# Patient Record
Sex: MALE | Race: WHITE | NOT HISPANIC OR LATINO | URBAN - METROPOLITAN AREA
[De-identification: names, ages, dates, MRNs, and addresses within clinical notes are randomized per-mention and may not be internally consistent; named-entity substitution may affect disease eponyms.]

---

## 2023-04-17 ENCOUNTER — LAB VISIT (OUTPATIENT)
Dept: LAB | Facility: HOSPITAL | Age: 53
End: 2023-04-17
Payer: COMMERCIAL

## 2023-04-17 ENCOUNTER — OFFICE VISIT (OUTPATIENT)
Dept: INTERNAL MEDICINE | Facility: CLINIC | Age: 53
End: 2023-04-17
Payer: COMMERCIAL

## 2023-04-17 ENCOUNTER — LAB VISIT (OUTPATIENT)
Dept: LAB | Facility: HOSPITAL | Age: 53
End: 2023-04-17
Attending: FAMILY MEDICINE
Payer: COMMERCIAL

## 2023-04-17 VITALS
DIASTOLIC BLOOD PRESSURE: 90 MMHG | WEIGHT: 315 LBS | TEMPERATURE: 98 F | HEIGHT: 74 IN | SYSTOLIC BLOOD PRESSURE: 140 MMHG | OXYGEN SATURATION: 96 % | HEART RATE: 76 BPM | BODY MASS INDEX: 40.43 KG/M2

## 2023-04-17 DIAGNOSIS — E11.9 TYPE 2 DIABETES MELLITUS WITHOUT COMPLICATION, UNSPECIFIED WHETHER LONG TERM INSULIN USE: ICD-10-CM

## 2023-04-17 DIAGNOSIS — Z12.5 SCREENING FOR PROSTATE CANCER: ICD-10-CM

## 2023-04-17 DIAGNOSIS — I10 HYPERTENSION, UNSPECIFIED TYPE: ICD-10-CM

## 2023-04-17 DIAGNOSIS — Z11.4 ENCOUNTER FOR SCREENING FOR HUMAN IMMUNODEFICIENCY VIRUS (HIV): ICD-10-CM

## 2023-04-17 DIAGNOSIS — G47.33 OSA ON CPAP: ICD-10-CM

## 2023-04-17 DIAGNOSIS — E66.01 MORBID OBESITY: ICD-10-CM

## 2023-04-17 DIAGNOSIS — I10 HYPERTENSION, UNSPECIFIED TYPE: Primary | ICD-10-CM

## 2023-04-17 DIAGNOSIS — Z11.59 NEED FOR HEPATITIS C SCREENING TEST: ICD-10-CM

## 2023-04-17 DIAGNOSIS — Z86.010 PERSONAL HISTORY OF COLONIC POLYPS: ICD-10-CM

## 2023-04-17 DIAGNOSIS — R07.9 CHEST PAIN, UNSPECIFIED TYPE: ICD-10-CM

## 2023-04-17 DIAGNOSIS — F41.9 ANXIETY: ICD-10-CM

## 2023-04-17 DIAGNOSIS — Z12.11 SCREEN FOR COLON CANCER: ICD-10-CM

## 2023-04-17 PROBLEM — Z86.0100 PERSONAL HISTORY OF COLONIC POLYPS: Status: ACTIVE | Noted: 2023-04-17

## 2023-04-17 PROCEDURE — 99999 PR PBB SHADOW E&M-NEW PATIENT-LVL V: ICD-10-PCS | Mod: PBBFAC,,, | Performed by: FAMILY MEDICINE

## 2023-04-17 PROCEDURE — 82607 VITAMIN B-12: CPT | Performed by: FAMILY MEDICINE

## 2023-04-17 PROCEDURE — 82570 ASSAY OF URINE CREATININE: CPT | Performed by: FAMILY MEDICINE

## 2023-04-17 PROCEDURE — 83036 HEMOGLOBIN GLYCOSYLATED A1C: CPT | Performed by: FAMILY MEDICINE

## 2023-04-17 PROCEDURE — 84153 ASSAY OF PSA TOTAL: CPT | Performed by: FAMILY MEDICINE

## 2023-04-17 PROCEDURE — 80061 LIPID PANEL: CPT | Performed by: FAMILY MEDICINE

## 2023-04-17 PROCEDURE — 3061F NEG MICROALBUMINURIA REV: CPT | Mod: CPTII,S$GLB,, | Performed by: FAMILY MEDICINE

## 2023-04-17 PROCEDURE — 1159F MED LIST DOCD IN RCRD: CPT | Mod: CPTII,S$GLB,, | Performed by: FAMILY MEDICINE

## 2023-04-17 PROCEDURE — 3077F SYST BP >= 140 MM HG: CPT | Mod: CPTII,S$GLB,, | Performed by: FAMILY MEDICINE

## 2023-04-17 PROCEDURE — 3066F NEPHROPATHY DOC TX: CPT | Mod: CPTII,S$GLB,, | Performed by: FAMILY MEDICINE

## 2023-04-17 PROCEDURE — 3080F DIAST BP >= 90 MM HG: CPT | Mod: CPTII,S$GLB,, | Performed by: FAMILY MEDICINE

## 2023-04-17 PROCEDURE — 3080F PR MOST RECENT DIASTOLIC BLOOD PRESSURE >= 90 MM HG: ICD-10-PCS | Mod: CPTII,S$GLB,, | Performed by: FAMILY MEDICINE

## 2023-04-17 PROCEDURE — 99204 PR OFFICE/OUTPT VISIT, NEW, LEVL IV, 45-59 MIN: ICD-10-PCS | Mod: S$GLB,,, | Performed by: FAMILY MEDICINE

## 2023-04-17 PROCEDURE — 3051F PR MOST RECENT HEMOGLOBIN A1C LEVEL 7.0 - < 8.0%: ICD-10-PCS | Mod: CPTII,S$GLB,, | Performed by: FAMILY MEDICINE

## 2023-04-17 PROCEDURE — 87389 HIV-1 AG W/HIV-1&-2 AB AG IA: CPT | Performed by: FAMILY MEDICINE

## 2023-04-17 PROCEDURE — 86803 HEPATITIS C AB TEST: CPT | Performed by: FAMILY MEDICINE

## 2023-04-17 PROCEDURE — 80053 COMPREHEN METABOLIC PANEL: CPT | Performed by: FAMILY MEDICINE

## 2023-04-17 PROCEDURE — 1159F PR MEDICATION LIST DOCUMENTED IN MEDICAL RECORD: ICD-10-PCS | Mod: CPTII,S$GLB,, | Performed by: FAMILY MEDICINE

## 2023-04-17 PROCEDURE — 3008F BODY MASS INDEX DOCD: CPT | Mod: CPTII,S$GLB,, | Performed by: FAMILY MEDICINE

## 2023-04-17 PROCEDURE — 99999 PR PBB SHADOW E&M-NEW PATIENT-LVL V: CPT | Mod: PBBFAC,,, | Performed by: FAMILY MEDICINE

## 2023-04-17 PROCEDURE — 36415 COLL VENOUS BLD VENIPUNCTURE: CPT | Performed by: FAMILY MEDICINE

## 2023-04-17 PROCEDURE — 3061F PR NEG MICROALBUMINURIA RESULT DOCUMENTED/REVIEW: ICD-10-PCS | Mod: CPTII,S$GLB,, | Performed by: FAMILY MEDICINE

## 2023-04-17 PROCEDURE — 3051F HG A1C>EQUAL 7.0%<8.0%: CPT | Mod: CPTII,S$GLB,, | Performed by: FAMILY MEDICINE

## 2023-04-17 PROCEDURE — 3008F PR BODY MASS INDEX (BMI) DOCUMENTED: ICD-10-PCS | Mod: CPTII,S$GLB,, | Performed by: FAMILY MEDICINE

## 2023-04-17 PROCEDURE — 99204 OFFICE O/P NEW MOD 45 MIN: CPT | Mod: S$GLB,,, | Performed by: FAMILY MEDICINE

## 2023-04-17 PROCEDURE — 3066F PR DOCUMENTATION OF TREATMENT FOR NEPHROPATHY: ICD-10-PCS | Mod: CPTII,S$GLB,, | Performed by: FAMILY MEDICINE

## 2023-04-17 PROCEDURE — 3077F PR MOST RECENT SYSTOLIC BLOOD PRESSURE >= 140 MM HG: ICD-10-PCS | Mod: CPTII,S$GLB,, | Performed by: FAMILY MEDICINE

## 2023-04-17 RX ORDER — CLONAZEPAM 1 MG/1
1 TABLET ORAL 2 TIMES DAILY PRN
COMMUNITY
End: 2023-06-09 | Stop reason: SDUPTHER

## 2023-04-17 RX ORDER — METOPROLOL SUCCINATE 100 MG/1
100 TABLET, EXTENDED RELEASE ORAL DAILY
COMMUNITY
Start: 1995-04-01 | End: 2023-10-12 | Stop reason: SDUPTHER

## 2023-04-17 RX ORDER — METFORMIN HYDROCHLORIDE 500 MG/1
500 TABLET ORAL 2 TIMES DAILY WITH MEALS
COMMUNITY
End: 2023-05-24 | Stop reason: SDUPTHER

## 2023-04-17 RX ORDER — OMEPRAZOLE 20 MG/1
20 CAPSULE, DELAYED RELEASE ORAL DAILY
COMMUNITY
End: 2023-10-12 | Stop reason: SDUPTHER

## 2023-04-17 RX ORDER — CITALOPRAM 40 MG/1
40 TABLET, FILM COATED ORAL DAILY
COMMUNITY
End: 2023-10-12 | Stop reason: SDUPTHER

## 2023-04-17 RX ORDER — ASPIRIN 325 MG
325 TABLET ORAL DAILY
COMMUNITY

## 2023-04-17 NOTE — PROGRESS NOTES
Subjective:      Patient ID: Yg Oh is a 53 y.o. male.    Chief Complaint: Establish Care    HPI estb care for issues problist below    Also occas nonexert chest pain w/o sob but occas orantes other times not every exertion    Anxiety since 20s: infreq prn klonopinand toprol helps    Walks 3=-4 miles /day at work    Patient Active Problem List   Diagnosis    Hypertension    Diabetes mellitus, type 2    Morbid obesity    Anxiety    Personal history of colonic polyps    MEGAN on CPAP      Past Medical History:   Diagnosis Date    Anxiety     Depression     Diabetes mellitus, type 2     GERD (gastroesophageal reflux disease)     Hypertension     Morbid obesity     MEGAN on CPAP     Personal history of colonic polyps       Past Surgical History:   Procedure Laterality Date    tka lef      2010      Social History     Socioeconomic History    Marital status:    Tobacco Use    Smoking status: Never    Smokeless tobacco: Current   Social History Narrative     . Moved to  from [place]      No family history on file.           Objective:     Physical Exam  Vitals and nursing note reviewed.   Constitutional:       Appearance: He is well-developed.   HENT:      Head: Normocephalic and atraumatic.      Right Ear: External ear normal.      Left Ear: External ear normal.      Nose: Nose normal.   Eyes:      General: No scleral icterus.     Conjunctiva/sclera: Conjunctivae normal.      Pupils: Pupils are equal, round, and reactive to light.   Neck:      Vascular: No carotid bruit.   Cardiovascular:      Rate and Rhythm: Normal rate and regular rhythm.      Heart sounds: Normal heart sounds. No murmur heard.    No friction rub. No gallop.   Pulmonary:      Effort: Pulmonary effort is normal.      Breath sounds: Normal breath sounds. No wheezing.   Abdominal:      General: Bowel sounds are normal. There is no distension.      Palpations: Abdomen is soft. There is no mass.      Tenderness: There is no  abdominal tenderness. There is no guarding or rebound.   Musculoskeletal:         General: No tenderness. Normal range of motion.      Cervical back: Normal range of motion and neck supple.   Lymphadenopathy:      Cervical: No cervical adenopathy.   Skin:     General: Skin is warm and dry.      Findings: No erythema or rash.   Neurological:      Mental Status: He is alert and oriented to person, place, and time.      Cranial Nerves: No cranial nerve deficit.      Coordination: Coordination normal.   Psychiatric:         Behavior: Behavior normal.         Thought Content: Thought content normal.         Judgment: Judgment normal.     Assessment:         ICD-10-CM ICD-9-CM   1. Hypertension, unspecified type  I10 401.9   2. Type 2 diabetes mellitus without complication, unspecified whether long term insulin use  E11.9 250.00   3. Encounter for screening for human immunodeficiency virus (HIV)  Z11.4 V73.89   4. Need for hepatitis C screening test  Z11.59 V73.89   5. Screening for prostate cancer  Z12.5 V76.44   6. Screen for colon cancer  Z12.11 V76.51   7. Morbid obesity  E66.01 278.01   8. Anxiety  F41.9 300.00   9. Chest pain, unspecified type  R07.9 786.50   10. Personal history of colonic polyps  Z86.010 V12.72   11. MEGAN on CPAP  G47.33 327.23    Z99.89 V46.8      Plan:    Pcp record  F/u flores two weeks; fu/ on lab ,bp.also consider [id number] d/wd statin    1. Hypertension, unspecified type  -     Comprehensive Metabolic Panel; Future; Expected date: 04/17/2023  -     Hypertension Digital Medicine ([facility]) Enrollment Order  -     Hypertension Digital Medicine ([facility]): Assign Onboarding Questionnaires    2. Type 2 diabetes mellitus without complication, unspecified whether long term insulin use  -     Hemoglobin A1C; Future; Expected date: 04/17/2023  -     Lipid Panel; Future; Expected date: 04/17/2023  -     Microalbumin/Creatinine Ratio, Urine; Future; Expected date: 04/17/2023  -     Vitamin B12; Future; Expected  date: 04/17/2023  -     Diabetes Digital Medicine ([facility]) Enrollment Order  -     Diabetes Digital Medicine ([facility]): Assign Onboarding Questionnaires    3. Encounter for screening for human immunodeficiency virus (HIV)  -     Hepatitis C Antibody; Future; Expected date: 04/17/2023    4. Need for hepatitis C screening test  -     HIV 1/2 Ag/Ab (4th Gen); Future; Expected date: 04/17/2023    5. Screening for prostate cancer  -     PSA, Screening; Future; Expected date: 04/17/2023    6. Screen for colon cancer  -     Ambulatory referral/consult to Endo Procedure ; Future; Expected date: 04/18/2023    7. Morbid obesity  -     Ambulatory referral/consult to Bariatric Surgery; Future; Expected date: 04/24/2023    8. Anxiety    9. Chest pain, unspecified type  -     EKG 12-lead; Future; Expected date: 04/17/2023  -     Stress Echo Which stress agent will be used? Treadmill Exercise; Color Flow Doppler? No; Future    10. Personal history of colonic polyps    11. MEGAN on CPAP     Nonfasting lab    Rf meds when due    D/c chew tob    D/wd bar surg

## 2023-04-18 LAB
ALBUMIN SERPL BCP-MCNC: 4.2 G/DL (ref 3.5–5.2)
ALBUMIN/CREAT UR: 29.4 UG/MG (ref 0–30)
ALP SERPL-CCNC: 125 U/L (ref 55–135)
ALT SERPL W/O P-5'-P-CCNC: 34 U/L (ref 10–44)
ANION GAP SERPL CALC-SCNC: 13 MMOL/L (ref 8–16)
AST SERPL-CCNC: 34 U/L (ref 10–40)
BILIRUB SERPL-MCNC: 0.3 MG/DL (ref 0.1–1)
BUN SERPL-MCNC: 13 MG/DL (ref 6–20)
CALCIUM SERPL-MCNC: 9.3 MG/DL (ref 8.7–10.5)
CHLORIDE SERPL-SCNC: 102 MMOL/L (ref 95–110)
CHOLEST SERPL-MCNC: 218 MG/DL (ref 120–199)
CHOLEST/HDLC SERPL: 6.1 {RATIO} (ref 2–5)
CO2 SERPL-SCNC: 21 MMOL/L (ref 23–29)
COMPLEXED PSA SERPL-MCNC: 0.68 NG/ML (ref 0–4)
CREAT SERPL-MCNC: 1 MG/DL (ref 0.5–1.4)
CREAT UR-MCNC: 102 MG/DL (ref 23–375)
EST. GFR  (NO RACE VARIABLE): >60 ML/MIN/1.73 M^2
ESTIMATED AVG GLUCOSE: 169 MG/DL (ref 68–131)
GLUCOSE SERPL-MCNC: 132 MG/DL (ref 70–110)
HBA1C MFR BLD: 7.5 % (ref 4–5.6)
HCV AB SERPL QL IA: NORMAL
HDLC SERPL-MCNC: 36 MG/DL (ref 40–75)
HDLC SERPL: 16.5 % (ref 20–50)
HIV 1+2 AB+HIV1 P24 AG SERPL QL IA: NORMAL
LDLC SERPL CALC-MCNC: 126.6 MG/DL (ref 63–159)
MICROALBUMIN UR DL<=1MG/L-MCNC: 30 UG/ML
NONHDLC SERPL-MCNC: 182 MG/DL
POTASSIUM SERPL-SCNC: 4.3 MMOL/L (ref 3.5–5.1)
PROT SERPL-MCNC: 7.8 G/DL (ref 6–8.4)
SODIUM SERPL-SCNC: 136 MMOL/L (ref 136–145)
TRIGL SERPL-MCNC: 277 MG/DL (ref 30–150)
VIT B12 SERPL-MCNC: 304 PG/ML (ref 210–950)

## 2023-04-20 ENCOUNTER — TELEPHONE (OUTPATIENT)
Dept: INTERNAL MEDICINE | Facility: CLINIC | Age: 53
End: 2023-04-20
Payer: COMMERCIAL

## 2023-04-20 NOTE — TELEPHONE ENCOUNTER
I tried calling the patient no answer a message was left for the patient to return the call to Dr Lainez office.

## 2023-04-21 RX ORDER — PRAVASTATIN SODIUM 40 MG/1
40 TABLET ORAL DAILY
Qty: 90 TABLET | Refills: 3 | Status: SHIPPED | OUTPATIENT
Start: 2023-04-21 | End: 2024-02-29

## 2023-04-21 NOTE — TELEPHONE ENCOUNTER
I spoke with the patient informing him that Dr Lainez     Pravastatin erxd   Please notify him and main side effect to monitor for is new muscle aches . If this occurs and last more than a few days then stop and let me know.        The patient stated understanding. Thanks for the call.

## 2023-04-21 NOTE — TELEPHONE ENCOUNTER
I spoke with the patient informing him of Dr Lainez recommendation      Please let him know diabetes not quite controlled. Georgette will discuss more at visit   Also should be on statin to help reduce heart attack and stroke risk.   If he has been on beforeand okwith resuming please let me know   If has not then we canstart now or when sees Georgette. Starting statin strongly recommended        The patient stated that he is not on a statin medication.    The patient stated that yes he would like to get on a statin medication.     The patient was informed that a message will be sent to Dr Lainez to order the medication     Patient pharmacy is updated.

## 2023-04-24 ENCOUNTER — HOSPITAL ENCOUNTER (OUTPATIENT)
Dept: CARDIOLOGY | Facility: HOSPITAL | Age: 53
Discharge: HOME | End: 2023-04-24
Payer: COMMERCIAL

## 2023-04-24 DIAGNOSIS — R07.9 CHEST PAIN, UNSPECIFIED TYPE: ICD-10-CM

## 2023-04-24 PROCEDURE — 93005 ELECTROCARDIOGRAM TRACING: CPT

## 2023-04-24 PROCEDURE — 93010 ELECTROCARDIOGRAM REPORT: CPT | Mod: ,,, | Performed by: INTERNAL MEDICINE

## 2023-04-24 PROCEDURE — 93010 EKG 12-LEAD: ICD-10-PCS | Mod: ,,, | Performed by: INTERNAL MEDICINE

## 2023-05-09 ENCOUNTER — TELEPHONE (OUTPATIENT)
Dept: CARDIOLOGY | Facility: HOSPITAL | Age: 53
End: 2023-05-09
Payer: COMMERCIAL

## 2023-05-23 ENCOUNTER — TELEPHONE (OUTPATIENT)
Dept: INTERNAL MEDICINE | Facility: CLINIC | Age: 53
End: 2023-05-23
Payer: COMMERCIAL

## 2023-05-23 ENCOUNTER — PATIENT MESSAGE (OUTPATIENT)
Dept: INTERNAL MEDICINE | Facility: CLINIC | Age: 53
End: 2023-05-23
Payer: COMMERCIAL

## 2023-05-23 RX ORDER — METFORMIN HYDROCHLORIDE 500 MG/1
500 TABLET ORAL 2 TIMES DAILY WITH MEALS
Status: CANCELLED | OUTPATIENT
Start: 2023-05-23

## 2023-05-23 NOTE — TELEPHONE ENCOUNTER
----- Message from Lupe Helm sent at 5/23/2023 10:54 AM CDT -----  Contact: Gurwinder  Type:  RX Refill Request    Who Called: Gurwinder  Refill or New Rx:refill  RX Name and Strength:metFORMIN (GLUCOPHAGE) 500 MG tablet  How is the patient currently taking it? (ex. 1XDay):as prescribed  Is this a 30 day or 90 day RX:30 days  Preferred Pharmacy with phone number:  [facility]/pharmacy #[id number] - [place] - [address] AT [facility]  [address] [place] [address]  Phone: [phone] Fax: [phone]  Local or Mail Order:local  Ordering Provider:Migel  Would the patient rather a call back or a response via [facility]? Call back  Best Call Back Number:[phone]  Additional Information: Pt states he cannot order refills of his historical medications on the [facility].     Thanks  TS

## 2023-05-24 NOTE — TELEPHONE ENCOUNTER
No care due was identified.  [facility] Embedded Care Due Messages. Reference number: [id number].   5/24/2023 12:28:42 PM CDT

## 2023-05-25 RX ORDER — METFORMIN HYDROCHLORIDE 500 MG/1
500 TABLET ORAL 2 TIMES DAILY WITH MEALS
Qty: 60 TABLET | Refills: 6 | Status: SHIPPED | OUTPATIENT
Start: 2023-05-25 | End: 2023-10-12

## 2023-06-08 ENCOUNTER — PATIENT MESSAGE (OUTPATIENT)
Dept: ADMINISTRATIVE | Facility: OTHER | Age: 53
End: 2023-06-08
Payer: COMMERCIAL

## 2023-06-08 ENCOUNTER — PATIENT MESSAGE (OUTPATIENT)
Dept: INTERNAL MEDICINE | Facility: CLINIC | Age: 53
End: 2023-06-08
Payer: COMMERCIAL

## 2023-06-09 NOTE — TELEPHONE ENCOUNTER
No care due was identified.  Health [facility] Embedded Care Due Messages. Reference number: [id number].   6/09/2023 9:21:08 AM CDT

## 2023-06-12 RX ORDER — CLONAZEPAM 1 MG/1
1 TABLET ORAL 2 TIMES DAILY PRN
Qty: 60 TABLET | Refills: 1 | Status: SHIPPED | OUTPATIENT
Start: 2023-06-12 | End: 2023-11-02

## 2023-07-07 ENCOUNTER — PATIENT MESSAGE (OUTPATIENT)
Dept: CARDIOLOGY | Facility: HOSPITAL | Age: 53
End: 2023-07-07
Payer: COMMERCIAL

## 2023-09-14 ENCOUNTER — PATIENT MESSAGE (OUTPATIENT)
Dept: ADMINISTRATIVE | Facility: OTHER | Age: 53
End: 2023-09-14
Payer: COMMERCIAL

## 2023-10-12 ENCOUNTER — OFFICE VISIT (OUTPATIENT)
Dept: INTERNAL MEDICINE | Facility: CLINIC | Age: 53
End: 2023-10-12
Payer: COMMERCIAL

## 2023-10-12 VITALS
HEART RATE: 106 BPM | OXYGEN SATURATION: 96 % | TEMPERATURE: 97 F | WEIGHT: 315 LBS | SYSTOLIC BLOOD PRESSURE: 136 MMHG | HEIGHT: 74 IN | BODY MASS INDEX: 40.43 KG/M2 | DIASTOLIC BLOOD PRESSURE: 94 MMHG

## 2023-10-12 DIAGNOSIS — Z86.010 PERSONAL HISTORY OF COLONIC POLYPS: ICD-10-CM

## 2023-10-12 DIAGNOSIS — E11.65 TYPE 2 DIABETES MELLITUS WITH HYPERGLYCEMIA, WITHOUT LONG-TERM CURRENT USE OF INSULIN: ICD-10-CM

## 2023-10-12 DIAGNOSIS — G47.33 OSA ON CPAP: ICD-10-CM

## 2023-10-12 DIAGNOSIS — I10 PRIMARY HYPERTENSION: Primary | ICD-10-CM

## 2023-10-12 DIAGNOSIS — Z82.49 FAMILY HISTORY OF CARDIOVASCULAR DISEASE: ICD-10-CM

## 2023-10-12 DIAGNOSIS — K21.9 GASTROESOPHAGEAL REFLUX DISEASE, UNSPECIFIED WHETHER ESOPHAGITIS PRESENT: ICD-10-CM

## 2023-10-12 DIAGNOSIS — E66.01 MORBID OBESITY: ICD-10-CM

## 2023-10-12 DIAGNOSIS — F41.9 ANXIETY: ICD-10-CM

## 2023-10-12 PROCEDURE — 3008F BODY MASS INDEX DOCD: CPT | Mod: CPTII,S$GLB,, | Performed by: PHYSICIAN ASSISTANT

## 2023-10-12 PROCEDURE — 3051F HG A1C>EQUAL 7.0%<8.0%: CPT | Mod: CPTII,S$GLB,, | Performed by: PHYSICIAN ASSISTANT

## 2023-10-12 PROCEDURE — 3075F PR MOST RECENT SYSTOLIC BLOOD PRESS GE 130-139MM HG: ICD-10-PCS | Mod: CPTII,S$GLB,, | Performed by: PHYSICIAN ASSISTANT

## 2023-10-12 PROCEDURE — 3066F PR DOCUMENTATION OF TREATMENT FOR NEPHROPATHY: ICD-10-PCS | Mod: CPTII,S$GLB,, | Performed by: PHYSICIAN ASSISTANT

## 2023-10-12 PROCEDURE — 4010F ACE/ARB THERAPY RXD/TAKEN: CPT | Mod: CPTII,S$GLB,, | Performed by: PHYSICIAN ASSISTANT

## 2023-10-12 PROCEDURE — 1159F MED LIST DOCD IN RCRD: CPT | Mod: CPTII,S$GLB,, | Performed by: PHYSICIAN ASSISTANT

## 2023-10-12 PROCEDURE — 3061F NEG MICROALBUMINURIA REV: CPT | Mod: CPTII,S$GLB,, | Performed by: PHYSICIAN ASSISTANT

## 2023-10-12 PROCEDURE — 3066F NEPHROPATHY DOC TX: CPT | Mod: CPTII,S$GLB,, | Performed by: PHYSICIAN ASSISTANT

## 2023-10-12 PROCEDURE — 99999 PR PBB SHADOW E&M-EST. PATIENT-LVL V: CPT | Mod: PBBFAC,,, | Performed by: PHYSICIAN ASSISTANT

## 2023-10-12 PROCEDURE — 3061F PR NEG MICROALBUMINURIA RESULT DOCUMENTED/REVIEW: ICD-10-PCS | Mod: CPTII,S$GLB,, | Performed by: PHYSICIAN ASSISTANT

## 2023-10-12 PROCEDURE — 3051F PR MOST RECENT HEMOGLOBIN A1C LEVEL 7.0 - < 8.0%: ICD-10-PCS | Mod: CPTII,S$GLB,, | Performed by: PHYSICIAN ASSISTANT

## 2023-10-12 PROCEDURE — 99214 PR OFFICE/OUTPT VISIT, EST, LEVL IV, 30-39 MIN: ICD-10-PCS | Mod: S$GLB,,, | Performed by: PHYSICIAN ASSISTANT

## 2023-10-12 PROCEDURE — 99214 OFFICE O/P EST MOD 30 MIN: CPT | Mod: S$GLB,,, | Performed by: PHYSICIAN ASSISTANT

## 2023-10-12 PROCEDURE — 1160F RVW MEDS BY RX/DR IN RCRD: CPT | Mod: CPTII,S$GLB,, | Performed by: PHYSICIAN ASSISTANT

## 2023-10-12 PROCEDURE — 99999 PR PBB SHADOW E&M-EST. PATIENT-LVL V: ICD-10-PCS | Mod: PBBFAC,,, | Performed by: PHYSICIAN ASSISTANT

## 2023-10-12 PROCEDURE — 4010F PR ACE/ARB THEARPY RXD/TAKEN: ICD-10-PCS | Mod: CPTII,S$GLB,, | Performed by: PHYSICIAN ASSISTANT

## 2023-10-12 PROCEDURE — 1160F PR REVIEW ALL MEDS BY PRESCRIBER/CLIN PHARMACIST DOCUMENTED: ICD-10-PCS | Mod: CPTII,S$GLB,, | Performed by: PHYSICIAN ASSISTANT

## 2023-10-12 PROCEDURE — 3080F PR MOST RECENT DIASTOLIC BLOOD PRESSURE >= 90 MM HG: ICD-10-PCS | Mod: CPTII,S$GLB,, | Performed by: PHYSICIAN ASSISTANT

## 2023-10-12 PROCEDURE — 1159F PR MEDICATION LIST DOCUMENTED IN MEDICAL RECORD: ICD-10-PCS | Mod: CPTII,S$GLB,, | Performed by: PHYSICIAN ASSISTANT

## 2023-10-12 PROCEDURE — 3080F DIAST BP >= 90 MM HG: CPT | Mod: CPTII,S$GLB,, | Performed by: PHYSICIAN ASSISTANT

## 2023-10-12 PROCEDURE — 3075F SYST BP GE 130 - 139MM HG: CPT | Mod: CPTII,S$GLB,, | Performed by: PHYSICIAN ASSISTANT

## 2023-10-12 PROCEDURE — 3008F PR BODY MASS INDEX (BMI) DOCUMENTED: ICD-10-PCS | Mod: CPTII,S$GLB,, | Performed by: PHYSICIAN ASSISTANT

## 2023-10-12 RX ORDER — LANCETS
EACH MISCELLANEOUS
Qty: 100 EACH | Refills: 6 | Status: SHIPPED | OUTPATIENT
Start: 2023-10-12

## 2023-10-12 RX ORDER — METOPROLOL SUCCINATE 100 MG/1
100 TABLET, EXTENDED RELEASE ORAL DAILY
Qty: 90 TABLET | Refills: 3 | Status: SHIPPED | OUTPATIENT
Start: 2023-10-12 | End: 2024-10-06

## 2023-10-12 RX ORDER — LOSARTAN POTASSIUM 50 MG/1
50 TABLET ORAL DAILY
Qty: 90 TABLET | Refills: 3 | Status: SHIPPED | OUTPATIENT
Start: 2023-10-12 | End: 2024-10-11

## 2023-10-12 RX ORDER — OMEPRAZOLE 20 MG/1
20 CAPSULE, DELAYED RELEASE ORAL DAILY
Qty: 90 CAPSULE | Refills: 3 | Status: SHIPPED | OUTPATIENT
Start: 2023-10-12 | End: 2024-10-06

## 2023-10-12 RX ORDER — INSULIN PUMP SYRINGE, 3 ML
EACH MISCELLANEOUS
Qty: 1 EACH | Refills: 0 | Status: SHIPPED | OUTPATIENT
Start: 2023-10-12 | End: 2024-10-11

## 2023-10-12 RX ORDER — METFORMIN HYDROCHLORIDE 500 MG/1
1000 TABLET ORAL 2 TIMES DAILY WITH MEALS
Qty: 360 TABLET | Refills: 3 | Status: SHIPPED | OUTPATIENT
Start: 2023-10-12 | End: 2024-10-06

## 2023-10-12 RX ORDER — CITALOPRAM 40 MG/1
40 TABLET, FILM COATED ORAL DAILY
Qty: 90 TABLET | Refills: 3 | Status: SHIPPED | OUTPATIENT
Start: 2023-10-12 | End: 2024-10-06

## 2023-10-12 RX ORDER — SEMAGLUTIDE 0.68 MG/ML
INJECTION, SOLUTION SUBCUTANEOUS
Qty: 3 ML | Refills: 0 | Status: SHIPPED | OUTPATIENT
Start: 2023-10-12 | End: 2023-11-09

## 2023-10-12 NOTE — PROGRESS NOTES
Subjective:      Patient ID: Yg Oh is a 53 y.o. male.    Chief Complaint: Hospital Follow Up    HPI  Here today for a follow up for a recent hospitalization after hernia surgery.   Hernia repair surgery last Wednesday (incisional abdominal hernia) and had complication with anesthesia after. Didnt need to be intubated. Lungs filled with fluids during surgery but likely due to positioning during surgery.  Glucose 400s while in the hospital. Hasn't been checking glucose at home because doesn't have glucometer. Only on metformin 500 BID.   Today back to baseline. No chest pain or shortness of breath. No cough or fever. Normal bowel movements. Abd spasms after surgery pretty severe but resolved on Tuesday.     Established care with Dr. Lainez in April. New patient to us.   Colonoscopy: done at outside facility maybe 5 years ago. Pt will track down records.   GLP1:no fam hx of medullary thyroid ca or MEN and no personal history of acute pancreatitis.     Patient Active Problem List   Diagnosis    Hypertension    Type 2 diabetes mellitus without complication, without long-term current use of insulin    Morbid obesity    Anxiety    Personal history of colonic polyps    MEGAN on CPAP         Current Outpatient Medications:     aspirin 325 MG tablet, Take 325 mg by mouth once daily., Disp: , Rfl:     clonazePAM (KLONOPIN) 1 MG tablet, Take 1 tablet (1 mg total) by mouth 2 (two) times daily as needed for Anxiety., Disp: 60 tablet, Rfl: 1    pravastatin (PRAVACHOL) 40 MG tablet, Take 1 tablet (40 mg total) by mouth once daily., Disp: 90 tablet, Rfl: 3    blood sugar diagnostic Strp, Use to check blood sugar twice daily, Disp: 100 each, Rfl: 6    blood-glucose meter kit, Use as instructed to check glucose twice daily, Disp: 1 each, Rfl: 0    citalopram (CELEXA) 40 MG tablet, Take 1 tablet (40 mg total) by mouth once daily., Disp: 90 tablet, Rfl: 3    lancets (LANCETS,THIN) Misc, Use to check blood sugar twice daily,  Disp: 100 each, Rfl: 6    losartan (COZAAR) 50 MG tablet, Take 1 tablet (50 mg total) by mouth once daily., Disp: 90 tablet, Rfl: 3    metFORMIN (GLUCOPHAGE) 500 MG tablet, Take 2 tablets (1,000 mg total) by mouth 2 (two) times daily with meals., Disp: 360 tablet, Rfl: 3    metoprolol succinate (TOPROL-XL) 100 MG 24 hr tablet, Take 1 tablet (100 mg total) by mouth once daily., Disp: 90 tablet, Rfl: 3    omeprazole (PRILOSEC) 20 MG capsule, Take 1 capsule (20 mg total) by mouth once daily., Disp: 90 capsule, Rfl: 3    semaglutide (OZEMPIC) 0.25 mg or 0.5 mg (2 mg/3 mL) pen injector, Inject 0.25 mg into the skin every 7 days for 14 days, THEN 0.5 mg every 7 days for 14 days., Disp: 3 mL, Rfl: 0    Review of Systems   Constitutional:  Negative for activity change, appetite change, chills, diaphoresis, fatigue, fever and unexpected weight change.   HENT: Negative.  Negative for congestion, hearing loss, postnasal drip, rhinorrhea, sore throat, trouble swallowing and voice change.    Eyes: Negative.  Negative for visual disturbance.   Respiratory: Negative.  Negative for cough, choking, chest tightness and shortness of breath.    Cardiovascular:  Negative for chest pain, palpitations and leg swelling.   Gastrointestinal:  Negative for abdominal distention, abdominal pain, blood in stool, constipation, diarrhea, nausea and vomiting.   Endocrine: Negative for cold intolerance, heat intolerance, polydipsia and polyuria.   Genitourinary: Negative.  Negative for difficulty urinating and frequency.   Musculoskeletal:  Negative for arthralgias, back pain, gait problem, joint swelling and myalgias.   Skin:  Negative for color change, pallor, rash and wound.   Neurological:  Negative for dizziness, tremors, weakness, light-headedness, numbness and headaches.   Hematological:  Negative for adenopathy.   Psychiatric/Behavioral:  Negative for behavioral problems, confusion, self-injury, sleep disturbance and suicidal ideas. The  "patient is not nervous/anxious.      Objective:   BP (!) 136/94 (BP Location: Left arm, Patient Position: Sitting, BP Method: Large (Manual))   Pulse 106   Temp 97.1 °F (36.2 °C) (Tympanic)   Ht 6' 2" (1.88 m)   Wt (!) 180.6 kg (398 lb 2.4 oz)   SpO2 96%   BMI 51.12 kg/m²     Physical Exam  Vitals and nursing note reviewed.   Constitutional:       General: He is not in acute distress.     Appearance: Normal appearance. He is well-developed. He is not ill-appearing, toxic-appearing or diaphoretic.   HENT:      Head: Normocephalic and atraumatic.   Cardiovascular:      Rate and Rhythm: Normal rate and regular rhythm.      Heart sounds: Normal heart sounds. No murmur heard.     No friction rub. No gallop.   Pulmonary:      Effort: Pulmonary effort is normal. No respiratory distress.      Breath sounds: Normal breath sounds. No wheezing or rales.   Skin:     General: Skin is warm.      Findings: No rash.   Neurological:      Mental Status: He is alert and oriented to person, place, and time.   Psychiatric:         Mood and Affect: Mood normal.         Behavior: Behavior normal.         Thought Content: Thought content normal.         Judgment: Judgment normal.       Lab Visit on 04/17/2023   Component Date Value Ref Range Status    Microalbumin, Urine 04/17/2023 30.0  ug/mL Final    Creatinine, Urine 04/17/2023 102.0  23.0 - 375.0 mg/dL Final    Microalb/Creat Ratio 04/17/2023 29.4  0.0 - 30.0 ug/mg Final   Lab Visit on 04/17/2023   Component Date Value Ref Range Status    Hemoglobin A1C 04/17/2023 7.5 (H)  4.0 - 5.6 % Final    Comment: ADA Screening Guidelines:  5.7-6.4%  Consistent with prediabetes  >or=6.5%  Consistent with diabetes    High levels of fetal hemoglobin interfere with the HbA1C  assay. Heterozygous hemoglobin variants (HbS, HgC, etc)do  not significantly interfere with this assay.   However, presence of multiple variants may affect accuracy.      Estimated Avg Glucose 04/17/2023 169 (H)  68 - 131 " mg/dL Final    Cholesterol 04/17/2023 218 (H)  120 - 199 mg/dL Final    Comment: The National Cholesterol Education Program (NCEP) has set the  following guidelines (reference ranges) for Cholesterol:  Optimal.....................<200 mg/dL  Borderline High.............200-239 mg/dL  High........................> or = 240 mg/dL      Triglycerides 04/17/2023 277 (H)  30 - 150 mg/dL Final    Comment: The National Cholesterol Education Program (NCEP) has set the  following guidelines (reference values) for triglycerides:  Normal......................<150 mg/dL  Borderline High.............150-199 mg/dL  High........................200-499 mg/dL      HDL 04/17/2023 36 (L)  40 - 75 mg/dL Final    Comment: The National Cholesterol Education Program (NCEP) has set the  following guidelines (reference values) for HDL Cholesterol:  Low...............<40 mg/dL  Optimal...........>60 mg/dL      LDL Cholesterol 04/17/2023 126.6  63.0 - 159.0 mg/dL Final    Comment: The National Cholesterol Education Program (NCEP) has set the  following guidelines (reference values) for LDL Cholesterol:  Optimal.......................<130 mg/dL  Borderline High...............130-159 mg/dL  High..........................160-189 mg/dL  Very High.....................>190 mg/dL      HDL/Cholesterol Ratio 04/17/2023 16.5 (L)  20.0 - 50.0 % Final    Total Cholesterol/HDL Ratio 04/17/2023 6.1 (H)  2.0 - 5.0 Final    Non-HDL Cholesterol 04/17/2023 182  mg/dL Final    Comment: Risk category and Non-HDL cholesterol goals:  Coronary heart disease (CHD)or equivalent (10-year risk of CHD >20%):  Non-HDL cholesterol goal     <130 mg/dL  Two or more CHD risk factors and 10-year risk of CHD <= 20%:  Non-HDL cholesterol goal     <160 mg/dL  0 to 1 CHD risk factor:  Non-HDL cholesterol goal     <190 mg/dL      Sodium 04/17/2023 136  136 - 145 mmol/L Final    Potassium 04/17/2023 4.3  3.5 - 5.1 mmol/L Final    Chloride 04/17/2023 102  95 - 110 mmol/L Final    CO2  04/17/2023 21 (L)  23 - 29 mmol/L Final    Glucose 04/17/2023 132 (H)  70 - 110 mg/dL Final    BUN 04/17/2023 13  6 - 20 mg/dL Final    Creatinine 04/17/2023 1.0  0.5 - 1.4 mg/dL Final    Calcium 04/17/2023 9.3  8.7 - 10.5 mg/dL Final    Total Protein 04/17/2023 7.8  6.0 - 8.4 g/dL Final    Albumin 04/17/2023 4.2  3.5 - 5.2 g/dL Final    Total Bilirubin 04/17/2023 0.3  0.1 - 1.0 mg/dL Final    Comment: For infants and newborns, interpretation of results should be based  on gestational age, weight and in agreement with clinical  observations.    Premature Infant recommended reference ranges:  Up to 24 hours.............<8.0 mg/dL  Up to 48 hours............<12.0 mg/dL  3-5 days..................<15.0 mg/dL  6-29 days.................<15.0 mg/dL      Alkaline Phosphatase 04/17/2023 125  55 - 135 U/L Final    AST 04/17/2023 34  10 - 40 U/L Final    ALT 04/17/2023 34  10 - 44 U/L Final    Anion Gap 04/17/2023 13  8 - 16 mmol/L Final    eGFR 04/17/2023 >60.0  >60 mL/min/1.73 m^2 Final    PSA, Screen 04/17/2023 0.68  0.00 - 4.00 ng/mL Final    Comment: The testing method is a chemiluminescent microparticle immunoassay   manufactured by [employer] and performed on the [employer]   or   ???? system. Values obtained with different assay manufacturers   for   methods may be different and cannot be used interchangeably.  PSA Expected levels:  Hormonal Therapy: <0.05 ng/ml  Prostatectomy: <0.01 ng/ml  Radiation Therapy: <1.00 ng/ml      Hepatitis C Ab 04/17/2023 Non-reactive  Non-reactive Final    HIV 1/2 Ag/Ab 04/17/2023 Non-reactive  Non-reactive Final    Vitamin B-12 04/17/2023 304  210 - 950 pg/mL Final     Results for orders placed or performed during the hospital encounter of 04/24/23   EKG 12-lead    Collection Time: 04/24/23  3:49 PM    Narrative    Test Reason : R07.9,    Vent. Rate : 069 BPM     Atrial Rate : 069 BPM     P-R Int : 172 ms          QRS Dur : 088 ms      QT Int : 402 ms       P-R-T Axes :  057 060 071 degrees     QTc Int : 430 ms    Normal sinus rhythm  Low voltage QRS  No previous ECGs available  Confirmed by Mina Nix MD ([id number]) on 4/24/2023 8:52:31 PM    Referred By: BISI MACDONALD           Confirmed By:Mina Nix MD      The 10-year ASCVD risk score (Verena OCONNELL, et al., 2019) is: 16.8%    Values used to calculate the score:      Age: 53 years      Sex: Male      Is Non- : No      Diabetic: Yes      Tobacco smoker: No      Systolic Blood Pressure: 136 mmHg      Is BP treated: Yes      HDL Cholesterol: 36 mg/dL      Total Cholesterol: 218 mg/dL    40+ minutes of total time spent on the encounter, which includes face to face time and non-face to face time preparing to see the patient (eg, review of tests), Obtaining and/or reviewing separately obtained history, documenting clinical information in the electronic or other health record, independently interpreting results (not separately reported) and communicating results to the patient/family/caregiver, or Care coordination (not separately reported).    Assessment:     1. Primary hypertension    2. Type 2 diabetes mellitus with hyperglycemia, without long-term current use of insulin    3. Morbid obesity    4. Anxiety    5. Personal history of colonic polyps    6. MEGAN on CPAP    7. Family history of cardiovascular disease    8. Gastroesophageal reflux disease, unspecified whether esophagitis present      Plan:   Primary hypertension  -     metoprolol succinate (TOPROL-XL) 100 MG 24 hr tablet; Take 1 tablet (100 mg total) by mouth once daily.  Dispense: 90 tablet; Refill: 3  -     losartan (COZAAR) 50 MG tablet; Take 1 tablet (50 mg total) by mouth once daily.  Dispense: 90 tablet; Refill: 3    Type 2 diabetes mellitus with hyperglycemia, without long-term current use of insulin  -     semaglutide (OZEMPIC) 0.25 mg or 0.5 mg (2 mg/3 mL) pen injector; Inject 0.25 mg into the skin every 7 days for 14 days, THEN 0.5 mg every  7 days for 14 days.  Dispense: 3 mL; Refill: 0  -     POCT Glucose, Hand-Held Device  -     blood-glucose meter kit; Use as instructed to check glucose twice daily  Dispense: 1 each; Refill: 0  -     blood sugar diagnostic Strp; Use to check blood sugar twice daily  Dispense: 100 each; Refill: 6  -     lancets (LANCETS,THIN) Misc; Use to check blood sugar twice daily  Dispense: 100 each; Refill: 6  -     metFORMIN (GLUCOPHAGE) 500 MG tablet; Take 2 tablets (1,000 mg total) by mouth 2 (two) times daily with meals.  Dispense: 360 tablet; Refill: 3  -     Ambulatory referral/consult to Diabetes Education; Future; Expected date: 10/19/2023  -     Hemoglobin A1C; Future; Expected date: 10/12/2023  -     CBC Auto Differential; Future; Expected date: 10/12/2023  -     Comprehensive Metabolic Panel; Future; Expected date: 10/12/2023    Morbid obesity    Anxiety  -     citalopram (CELEXA) 40 MG tablet; Take 1 tablet (40 mg total) by mouth once daily.  Dispense: 90 tablet; Refill: 3    Personal history of colonic polyps    MEGAN on CPAP    Family history of cardiovascular disease  -     Ambulatory referral/consult to Cardiology; Future; Expected date: 10/19/2023    Gastroesophageal reflux disease, unspecified whether esophagitis present  -     omeprazole (PRILOSEC) 20 MG capsule; Take 1 capsule (20 mg total) by mouth once daily.  Dispense: 90 capsule; Refill: 3    -request records for recent hospitalization  -get records from previous colonoscopy  -agree to start ozempic and increase metformin from 500 BID to 1000 BID. SE discussed.       Follow up in about 4 weeks (around 11/9/2023), or if symptoms worsen or fail to improve.

## 2023-10-15 DIAGNOSIS — E11.65 TYPE 2 DIABETES MELLITUS WITH HYPERGLYCEMIA, WITHOUT LONG-TERM CURRENT USE OF INSULIN: ICD-10-CM

## 2023-10-15 NOTE — TELEPHONE ENCOUNTER
No care due was identified.  Health [facility] Embedded Care Due Messages. Reference number: [id number].   10/15/2023 11:37:53 AM CDT

## 2023-10-18 RX ORDER — METFORMIN HYDROCHLORIDE 500 MG/1
500 TABLET ORAL 2 TIMES DAILY WITH MEALS
Qty: 180 TABLET | Refills: 2 | OUTPATIENT
Start: 2023-10-18

## 2023-10-18 NOTE — TELEPHONE ENCOUNTER
Refill Decision Note   Yg Oh  is requesting a refill authorization.  Brief Assessment and Rationale for Refill:  Quick Discontinue     Medication Therapy Plan:  Dose increased to 2 tablets BID WM 10/12/23      Comments:     Note composed:5:34 PM 10/18/2023

## 2023-11-01 NOTE — TELEPHONE ENCOUNTER
No care due was identified.  [facility] Embedded Care Due Messages. Reference number: [id number].   10/31/2023 7:58:20 PM CDT

## 2023-11-02 RX ORDER — CLONAZEPAM 1 MG/1
1 TABLET ORAL 2 TIMES DAILY PRN
Qty: 60 TABLET | Refills: 5 | Status: SHIPPED | OUTPATIENT
Start: 2023-11-02

## 2023-11-03 RX ORDER — CLONAZEPAM 1 MG/1
1 TABLET ORAL 2 TIMES DAILY PRN
Qty: 60 TABLET | Refills: 5 | OUTPATIENT
Start: 2023-11-03

## 2023-11-03 NOTE — TELEPHONE ENCOUNTER
No care due was identified.  Health [facility] Embedded Care Due Messages. Reference number: [id number].   11/03/2023 11:24:48 AM CDT

## 2023-11-13 ENCOUNTER — LAB VISIT (OUTPATIENT)
Dept: LAB | Facility: HOSPITAL | Age: 53
End: 2023-11-13
Attending: PHYSICIAN ASSISTANT
Payer: COMMERCIAL

## 2023-11-13 ENCOUNTER — OFFICE VISIT (OUTPATIENT)
Dept: INTERNAL MEDICINE | Facility: CLINIC | Age: 53
End: 2023-11-13
Payer: COMMERCIAL

## 2023-11-13 VITALS
WEIGHT: 315 LBS | SYSTOLIC BLOOD PRESSURE: 132 MMHG | HEART RATE: 90 BPM | TEMPERATURE: 98 F | DIASTOLIC BLOOD PRESSURE: 76 MMHG | HEIGHT: 74 IN | BODY MASS INDEX: 40.43 KG/M2 | OXYGEN SATURATION: 97 %

## 2023-11-13 DIAGNOSIS — J06.9 VIRAL URI WITH COUGH: ICD-10-CM

## 2023-11-13 DIAGNOSIS — G47.33 OSA (OBSTRUCTIVE SLEEP APNEA): ICD-10-CM

## 2023-11-13 DIAGNOSIS — E11.65 TYPE 2 DIABETES MELLITUS WITH HYPERGLYCEMIA, WITHOUT LONG-TERM CURRENT USE OF INSULIN: ICD-10-CM

## 2023-11-13 DIAGNOSIS — I10 PRIMARY HYPERTENSION: ICD-10-CM

## 2023-11-13 DIAGNOSIS — E11.9 TYPE 2 DIABETES MELLITUS WITHOUT COMPLICATION, WITHOUT LONG-TERM CURRENT USE OF INSULIN: Primary | ICD-10-CM

## 2023-11-13 LAB
ALBUMIN SERPL BCP-MCNC: 3.8 G/DL (ref 3.5–5.2)
ALP SERPL-CCNC: 96 U/L (ref 55–135)
ALT SERPL W/O P-5'-P-CCNC: 27 U/L (ref 10–44)
ANION GAP SERPL CALC-SCNC: 12 MMOL/L (ref 8–16)
AST SERPL-CCNC: 28 U/L (ref 10–40)
BASOPHILS # BLD AUTO: 0.04 K/UL (ref 0–0.2)
BASOPHILS NFR BLD: 0.5 % (ref 0–1.9)
BILIRUB SERPL-MCNC: 0.4 MG/DL (ref 0.1–1)
BUN SERPL-MCNC: 8 MG/DL (ref 6–20)
CALCIUM SERPL-MCNC: 9.9 MG/DL (ref 8.7–10.5)
CHLORIDE SERPL-SCNC: 105 MMOL/L (ref 95–110)
CO2 SERPL-SCNC: 23 MMOL/L (ref 23–29)
CREAT SERPL-MCNC: 1 MG/DL (ref 0.5–1.4)
DIFFERENTIAL METHOD: ABNORMAL
EOSINOPHIL # BLD AUTO: 0.3 K/UL (ref 0–0.5)
EOSINOPHIL NFR BLD: 3.5 % (ref 0–8)
ERYTHROCYTE [DISTWIDTH] IN BLOOD BY AUTOMATED COUNT: 13.7 % (ref 11.5–14.5)
EST. GFR  (NO RACE VARIABLE): >60 ML/MIN/1.73 M^2
ESTIMATED AVG GLUCOSE: 206 MG/DL (ref 68–131)
GLUCOSE SERPL-MCNC: 116 MG/DL (ref 70–110)
HBA1C MFR BLD: 8.8 % (ref 4–5.6)
HCT VFR BLD AUTO: 47.4 % (ref 40–54)
HGB BLD-MCNC: 14.8 G/DL (ref 14–18)
IMM GRANULOCYTES # BLD AUTO: 0.03 K/UL (ref 0–0.04)
IMM GRANULOCYTES NFR BLD AUTO: 0.4 % (ref 0–0.5)
LYMPHOCYTES # BLD AUTO: 2.5 K/UL (ref 1–4.8)
LYMPHOCYTES NFR BLD: 29 % (ref 18–48)
MCH RBC QN AUTO: 27.3 PG (ref 27–31)
MCHC RBC AUTO-ENTMCNC: 31.2 G/DL (ref 32–36)
MCV RBC AUTO: 88 FL (ref 82–98)
MONOCYTES # BLD AUTO: 0.7 K/UL (ref 0.3–1)
MONOCYTES NFR BLD: 7.8 % (ref 4–15)
NEUTROPHILS # BLD AUTO: 5 K/UL (ref 1.8–7.7)
NEUTROPHILS NFR BLD: 58.8 % (ref 38–73)
NRBC BLD-RTO: 0 /100 WBC
PLATELET # BLD AUTO: 241 K/UL (ref 150–450)
PMV BLD AUTO: 12.1 FL (ref 9.2–12.9)
POTASSIUM SERPL-SCNC: 4.3 MMOL/L (ref 3.5–5.1)
PROT SERPL-MCNC: 7.8 G/DL (ref 6–8.4)
RBC # BLD AUTO: 5.42 M/UL (ref 4.6–6.2)
SODIUM SERPL-SCNC: 140 MMOL/L (ref 136–145)
WBC # BLD AUTO: 8.47 K/UL (ref 3.9–12.7)

## 2023-11-13 PROCEDURE — 3066F PR DOCUMENTATION OF TREATMENT FOR NEPHROPATHY: ICD-10-PCS | Mod: CPTII,S$GLB,, | Performed by: PHYSICIAN ASSISTANT

## 2023-11-13 PROCEDURE — 3078F PR MOST RECENT DIASTOLIC BLOOD PRESSURE < 80 MM HG: ICD-10-PCS | Mod: CPTII,S$GLB,, | Performed by: PHYSICIAN ASSISTANT

## 2023-11-13 PROCEDURE — 99213 OFFICE O/P EST LOW 20 MIN: CPT | Mod: S$GLB,,, | Performed by: PHYSICIAN ASSISTANT

## 2023-11-13 PROCEDURE — 99999 PR PBB SHADOW E&M-EST. PATIENT-LVL V: CPT | Mod: PBBFAC,,, | Performed by: PHYSICIAN ASSISTANT

## 2023-11-13 PROCEDURE — 99213 PR OFFICE/OUTPT VISIT, EST, LEVL III, 20-29 MIN: ICD-10-PCS | Mod: S$GLB,,, | Performed by: PHYSICIAN ASSISTANT

## 2023-11-13 PROCEDURE — 3051F PR MOST RECENT HEMOGLOBIN A1C LEVEL 7.0 - < 8.0%: ICD-10-PCS | Mod: CPTII,S$GLB,, | Performed by: PHYSICIAN ASSISTANT

## 2023-11-13 PROCEDURE — 3008F BODY MASS INDEX DOCD: CPT | Mod: CPTII,S$GLB,, | Performed by: PHYSICIAN ASSISTANT

## 2023-11-13 PROCEDURE — 3061F NEG MICROALBUMINURIA REV: CPT | Mod: CPTII,S$GLB,, | Performed by: PHYSICIAN ASSISTANT

## 2023-11-13 PROCEDURE — 83036 HEMOGLOBIN GLYCOSYLATED A1C: CPT | Performed by: PHYSICIAN ASSISTANT

## 2023-11-13 PROCEDURE — 3078F DIAST BP <80 MM HG: CPT | Mod: CPTII,S$GLB,, | Performed by: PHYSICIAN ASSISTANT

## 2023-11-13 PROCEDURE — 1160F PR REVIEW ALL MEDS BY PRESCRIBER/CLIN PHARMACIST DOCUMENTED: ICD-10-PCS | Mod: CPTII,S$GLB,, | Performed by: PHYSICIAN ASSISTANT

## 2023-11-13 PROCEDURE — 99999 PR PBB SHADOW E&M-EST. PATIENT-LVL V: ICD-10-PCS | Mod: PBBFAC,,, | Performed by: PHYSICIAN ASSISTANT

## 2023-11-13 PROCEDURE — 1159F PR MEDICATION LIST DOCUMENTED IN MEDICAL RECORD: ICD-10-PCS | Mod: CPTII,S$GLB,, | Performed by: PHYSICIAN ASSISTANT

## 2023-11-13 PROCEDURE — 3075F PR MOST RECENT SYSTOLIC BLOOD PRESS GE 130-139MM HG: ICD-10-PCS | Mod: CPTII,S$GLB,, | Performed by: PHYSICIAN ASSISTANT

## 2023-11-13 PROCEDURE — 3075F SYST BP GE 130 - 139MM HG: CPT | Mod: CPTII,S$GLB,, | Performed by: PHYSICIAN ASSISTANT

## 2023-11-13 PROCEDURE — 3051F HG A1C>EQUAL 7.0%<8.0%: CPT | Mod: CPTII,S$GLB,, | Performed by: PHYSICIAN ASSISTANT

## 2023-11-13 PROCEDURE — 4010F ACE/ARB THERAPY RXD/TAKEN: CPT | Mod: CPTII,S$GLB,, | Performed by: PHYSICIAN ASSISTANT

## 2023-11-13 PROCEDURE — 36415 COLL VENOUS BLD VENIPUNCTURE: CPT | Performed by: PHYSICIAN ASSISTANT

## 2023-11-13 PROCEDURE — 4010F PR ACE/ARB THEARPY RXD/TAKEN: ICD-10-PCS | Mod: CPTII,S$GLB,, | Performed by: PHYSICIAN ASSISTANT

## 2023-11-13 PROCEDURE — 1159F MED LIST DOCD IN RCRD: CPT | Mod: CPTII,S$GLB,, | Performed by: PHYSICIAN ASSISTANT

## 2023-11-13 PROCEDURE — 85025 COMPLETE CBC W/AUTO DIFF WBC: CPT | Performed by: PHYSICIAN ASSISTANT

## 2023-11-13 PROCEDURE — 3066F NEPHROPATHY DOC TX: CPT | Mod: CPTII,S$GLB,, | Performed by: PHYSICIAN ASSISTANT

## 2023-11-13 PROCEDURE — 80053 COMPREHEN METABOLIC PANEL: CPT | Performed by: PHYSICIAN ASSISTANT

## 2023-11-13 PROCEDURE — 3008F PR BODY MASS INDEX (BMI) DOCUMENTED: ICD-10-PCS | Mod: CPTII,S$GLB,, | Performed by: PHYSICIAN ASSISTANT

## 2023-11-13 PROCEDURE — 1160F RVW MEDS BY RX/DR IN RCRD: CPT | Mod: CPTII,S$GLB,, | Performed by: PHYSICIAN ASSISTANT

## 2023-11-13 PROCEDURE — 3061F PR NEG MICROALBUMINURIA RESULT DOCUMENTED/REVIEW: ICD-10-PCS | Mod: CPTII,S$GLB,, | Performed by: PHYSICIAN ASSISTANT

## 2023-11-13 RX ORDER — AZELASTINE 1 MG/ML
1 SPRAY, METERED NASAL 2 TIMES DAILY
Qty: 30 ML | Refills: 0 | Status: SHIPPED | OUTPATIENT
Start: 2023-11-13 | End: 2024-02-20

## 2023-11-13 RX ORDER — FLUTICASONE PROPIONATE 50 MCG
1 SPRAY, SUSPENSION (ML) NASAL DAILY
Qty: 9.9 ML | Refills: 1 | Status: SHIPPED | OUTPATIENT
Start: 2023-11-13 | End: 2023-12-28

## 2023-11-13 RX ORDER — SEMAGLUTIDE 1.34 MG/ML
1 INJECTION, SOLUTION SUBCUTANEOUS
Qty: 3 ML | Refills: 11 | Status: SHIPPED | OUTPATIENT
Start: 2023-11-13 | End: 2024-01-18 | Stop reason: DRUGHIGH

## 2023-11-13 RX ORDER — LANCETS
EACH MISCELLANEOUS 2 TIMES DAILY
COMMUNITY
Start: 2023-10-12

## 2023-11-13 NOTE — PROGRESS NOTES
Subjective:      Patient ID: Yg Oh is a 53 y.o. male.    Chief Complaint: Follow-up      Here today for a 1 month follow up after starting on losartan and ozempic. Also increased the metformin. Pt doing great with the chagnes. No SE with the new medications. Does report some weight loss and reduced appetite since starting on the ozempic. Completed 2 weeks of 0.25 and two weeks of 0.5.       URI   This is a new problem. Episode onset: 11 days. There has been no fever. Associated symptoms include congestion, coughing, rhinorrhea, sinus pain, sneezing, a sore throat and wheezing. Pertinent negatives include no abdominal pain, chest pain, diarrhea, dysuria, ear pain, headaches, joint pain, joint swelling, nausea, neck pain, plugged ear sensation, rash, swollen glands or vomiting. Treatments tried: mucinex fast max, theraflu, nyquil. The treatment provided no relief.       Patient Active Problem List   Diagnosis    Hypertension    Type 2 diabetes mellitus without complication, without long-term current use of insulin    Morbid obesity    Anxiety    Personal history of colonic polyps    MEGAN on CPAP         Current Outpatient Medications:     aspirin 325 MG tablet, Take 325 mg by mouth once daily., Disp: , Rfl:     blood sugar diagnostic Strp, Use to check blood sugar twice daily, Disp: 100 each, Rfl: 6    blood-glucose meter kit, Use as instructed to check glucose twice daily, Disp: 1 each, Rfl: 0    citalopram (CELEXA) 40 MG tablet, Take 1 tablet (40 mg total) by mouth once daily., Disp: 90 tablet, Rfl: 3    clonazePAM (KLONOPIN) 1 MG tablet, TAKE 1 TABLET BY MOUTH 2 TIMES DAILY AS NEEDED FOR ANXIETY., Disp: 60 tablet, Rfl: 5    lancets (LANCETS,THIN) Misc, Use to check blood sugar twice daily, Disp: 100 each, Rfl: 6    lancets Misc, Apply topically 2 (two) times daily., Disp: , Rfl:     losartan (COZAAR) 50 MG tablet, Take 1 tablet (50 mg total) by mouth once daily., Disp: 90 tablet, Rfl: 3    metFORMIN  (GLUCOPHAGE) 500 MG tablet, Take 2 tablets (1,000 mg total) by mouth 2 (two) times daily with meals., Disp: 360 tablet, Rfl: 3    metoprolol succinate (TOPROL-XL) 100 MG 24 hr tablet, Take 1 tablet (100 mg total) by mouth once daily., Disp: 90 tablet, Rfl: 3    omeprazole (PRILOSEC) 20 MG capsule, Take 1 capsule (20 mg total) by mouth once daily., Disp: 90 capsule, Rfl: 3    pravastatin (PRAVACHOL) 40 MG tablet, Take 1 tablet (40 mg total) by mouth once daily., Disp: 90 tablet, Rfl: 3    azelastine (ASTELIN) 137 mcg (0.1 %) nasal spray, 1 spray (137 mcg total) by Nasal route 2 (two) times daily., Disp: 30 mL, Rfl: 0    fluticasone propionate (FLONASE) 50 mcg/actuation nasal spray, 1 spray (50 mcg total) by Each Nostril route once daily., Disp: 9.9 mL, Rfl: 1    semaglutide (OZEMPIC) 1 mg/dose (4 mg/3 mL), Inject 1 mg into the skin every 7 days., Disp: 3 mL, Rfl: 11    Review of Systems   Constitutional:  Negative for activity change, appetite change, chills, diaphoresis, fatigue, fever and unexpected weight change.   HENT:  Positive for congestion, rhinorrhea, sinus pain, sneezing and sore throat. Negative for ear pain, hearing loss, postnasal drip, trouble swallowing and voice change.    Eyes: Negative.  Negative for visual disturbance.   Respiratory:  Positive for cough and wheezing. Negative for choking, chest tightness and shortness of breath.    Cardiovascular:  Negative for chest pain, palpitations and leg swelling.   Gastrointestinal:  Negative for abdominal distention, abdominal pain, blood in stool, constipation, diarrhea, nausea and vomiting.   Endocrine: Negative for cold intolerance, heat intolerance, polydipsia and polyuria.   Genitourinary: Negative.  Negative for difficulty urinating, dysuria and frequency.   Musculoskeletal:  Negative for arthralgias, back pain, gait problem, joint pain, joint swelling, myalgias and neck pain.   Skin:  Negative for color change, pallor, rash and wound.  "  Neurological:  Negative for dizziness, tremors, weakness, light-headedness, numbness and headaches.   Hematological:  Negative for adenopathy.   Psychiatric/Behavioral:  Negative for behavioral problems, confusion, self-injury, sleep disturbance and suicidal ideas. The patient is not nervous/anxious.      Objective:   /76 (BP Location: Left arm, Patient Position: Sitting, BP Method: Large (Manual))   Pulse 90   Temp 97.6 °F (36.4 °C) (Tympanic)   Ht 6' 2" (1.88 m)   Wt (!) 177.1 kg (390 lb 7 oz)   SpO2 97%   BMI 50.13 kg/m²     Physical Exam  Vitals reviewed.   Constitutional:       General: He is not in acute distress.     Appearance: Normal appearance. He is well-developed. He is not ill-appearing, toxic-appearing or diaphoretic.   HENT:      Head: Normocephalic and atraumatic.      Right Ear: External ear normal.      Left Ear: External ear normal.      Nose: Nose normal.   Eyes:      Conjunctiva/sclera: Conjunctivae normal.      Pupils: Pupils are equal, round, and reactive to light.   Cardiovascular:      Rate and Rhythm: Normal rate and regular rhythm.      Pulses:           Dorsalis pedis pulses are 2+ on the right side and 2+ on the left side.        Posterior tibial pulses are 2+ on the right side and 2+ on the left side.      Heart sounds: Normal heart sounds. No murmur heard.     No friction rub. No gallop.   Pulmonary:      Effort: Pulmonary effort is normal. No respiratory distress.      Breath sounds: Normal breath sounds. No wheezing or rales.   Chest:      Chest wall: No tenderness.   Abdominal:      General: There is no distension.      Palpations: Abdomen is soft.      Tenderness: There is no abdominal tenderness.   Musculoskeletal:         General: Normal range of motion.      Cervical back: Normal range of motion and neck supple.   Feet:      Right foot:      Protective Sensation: 10 sites tested.  10 sites sensed.      Skin integrity: Skin integrity normal.      Toenail Condition: " Right toenails are normal.      Left foot:      Protective Sensation: 10 sites tested.  10 sites sensed.      Skin integrity: Skin integrity normal.      Toenail Condition: Left toenails are normal.   Lymphadenopathy:      Cervical: No cervical adenopathy.   Skin:     General: Skin is warm and dry.      Capillary Refill: Capillary refill takes less than 2 seconds.      Findings: No rash.   Neurological:      Mental Status: He is alert and oriented to person, place, and time.      Motor: No weakness.      Coordination: Coordination normal.      Gait: Gait normal.   Psychiatric:         Mood and Affect: Mood normal.         Behavior: Behavior normal.         Thought Content: Thought content normal.         Judgment: Judgment normal.         Assessment:     1. Type 2 diabetes mellitus without complication, without long-term current use of insulin    2. Primary hypertension    3. Viral URI with cough    4. MEGAN (obstructive sleep apnea)      Plan:   Type 2 diabetes mellitus without complication, without long-term current use of insulin  -     semaglutide (OZEMPIC) 1 mg/dose (4 mg/3 mL); Inject 1 mg into the skin every 7 days.  Dispense: 3 mL; Refill: 11  -     Ambulatory referral/consult to Optometry; Future; Expected date: 11/20/2023  -doing well on metformin. Will increase ozempic from 0.5 to 1mg since tolerating well.   -normal foot exam today  -will get him in to see optometry for eye exam.     Primary hypertension  -stable and well controlled on losartan and metoprolol.     Viral URI with cough  -     azelastine (ASTELIN) 137 mcg (0.1 %) nasal spray; 1 spray (137 mcg total) by Nasal route 2 (two) times daily.  Dispense: 30 mL; Refill: 0  -     fluticasone propionate (FLONASE) 50 mcg/actuation nasal spray; 1 spray (50 mcg total) by Each Nostril route once daily.  Dispense: 9.9 mL; Refill: 1  -if no improvement by Thursday, message me on my chart.     MEGAN (obstructive sleep apnea)  -     Ambulatory referral/consult to  Pulmonology; Future; Expected date: 11/20/2023  -will get established with a new sleep team    -labs today (cbc, cmp, A1c)        Follow up in about 3 months (around 2/13/2024), or if symptoms worsen or fail to improve.

## 2023-11-17 ENCOUNTER — HOSPITAL ENCOUNTER (OUTPATIENT)
Dept: RADIOLOGY | Facility: HOSPITAL | Age: 53
Discharge: HOME | End: 2023-11-17
Attending: INTERNAL MEDICINE
Payer: COMMERCIAL

## 2023-11-17 ENCOUNTER — OFFICE VISIT (OUTPATIENT)
Dept: PULMONOLOGY | Facility: CLINIC | Age: 53
End: 2023-11-17
Payer: COMMERCIAL

## 2023-11-17 VITALS
OXYGEN SATURATION: 98 % | WEIGHT: 315 LBS | SYSTOLIC BLOOD PRESSURE: 124 MMHG | DIASTOLIC BLOOD PRESSURE: 80 MMHG | BODY MASS INDEX: 40.43 KG/M2 | HEIGHT: 74 IN | HEART RATE: 98 BPM | RESPIRATION RATE: 18 BRPM

## 2023-11-17 DIAGNOSIS — G47.33 OSA ON CPAP: ICD-10-CM

## 2023-11-17 DIAGNOSIS — G47.33 OSA (OBSTRUCTIVE SLEEP APNEA): ICD-10-CM

## 2023-11-17 DIAGNOSIS — E11.9 TYPE 2 DIABETES MELLITUS WITHOUT COMPLICATION, WITHOUT LONG-TERM CURRENT USE OF INSULIN: ICD-10-CM

## 2023-11-17 DIAGNOSIS — G47.33 OSA (OBSTRUCTIVE SLEEP APNEA): Primary | ICD-10-CM

## 2023-11-17 DIAGNOSIS — I10 PRIMARY HYPERTENSION: ICD-10-CM

## 2023-11-17 PROCEDURE — 3052F PR MOST RECENT HEMOGLOBIN A1C LEVEL 8.0 - < 9.0%: ICD-10-PCS | Mod: CPTII,S$GLB,, | Performed by: INTERNAL MEDICINE

## 2023-11-17 PROCEDURE — 99999 PR PBB SHADOW E&M-EST. PATIENT-LVL V: CPT | Mod: PBBFAC,,, | Performed by: INTERNAL MEDICINE

## 2023-11-17 PROCEDURE — 3074F SYST BP LT 130 MM HG: CPT | Mod: CPTII,S$GLB,, | Performed by: INTERNAL MEDICINE

## 2023-11-17 PROCEDURE — 3061F PR NEG MICROALBUMINURIA RESULT DOCUMENTED/REVIEW: ICD-10-PCS | Mod: CPTII,S$GLB,, | Performed by: INTERNAL MEDICINE

## 2023-11-17 PROCEDURE — 3066F PR DOCUMENTATION OF TREATMENT FOR NEPHROPATHY: ICD-10-PCS | Mod: CPTII,S$GLB,, | Performed by: INTERNAL MEDICINE

## 2023-11-17 PROCEDURE — 71046 X-RAY EXAM CHEST 2 VIEWS: CPT | Mod: 26,,, | Performed by: RADIOLOGY

## 2023-11-17 PROCEDURE — 3052F HG A1C>EQUAL 8.0%<EQUAL 9.0%: CPT | Mod: CPTII,S$GLB,, | Performed by: INTERNAL MEDICINE

## 2023-11-17 PROCEDURE — 3061F NEG MICROALBUMINURIA REV: CPT | Mod: CPTII,S$GLB,, | Performed by: INTERNAL MEDICINE

## 2023-11-17 PROCEDURE — 3008F BODY MASS INDEX DOCD: CPT | Mod: CPTII,S$GLB,, | Performed by: INTERNAL MEDICINE

## 2023-11-17 PROCEDURE — 1160F RVW MEDS BY RX/DR IN RCRD: CPT | Mod: CPTII,S$GLB,, | Performed by: INTERNAL MEDICINE

## 2023-11-17 PROCEDURE — 71046 XR CHEST PA AND LATERAL: ICD-10-PCS | Mod: 26,,, | Performed by: RADIOLOGY

## 2023-11-17 PROCEDURE — 1159F MED LIST DOCD IN RCRD: CPT | Mod: CPTII,S$GLB,, | Performed by: INTERNAL MEDICINE

## 2023-11-17 PROCEDURE — 4010F ACE/ARB THERAPY RXD/TAKEN: CPT | Mod: CPTII,S$GLB,, | Performed by: INTERNAL MEDICINE

## 2023-11-17 PROCEDURE — 3008F PR BODY MASS INDEX (BMI) DOCUMENTED: ICD-10-PCS | Mod: CPTII,S$GLB,, | Performed by: INTERNAL MEDICINE

## 2023-11-17 PROCEDURE — 99999 PR PBB SHADOW E&M-EST. PATIENT-LVL V: ICD-10-PCS | Mod: PBBFAC,,, | Performed by: INTERNAL MEDICINE

## 2023-11-17 PROCEDURE — 1160F PR REVIEW ALL MEDS BY PRESCRIBER/CLIN PHARMACIST DOCUMENTED: ICD-10-PCS | Mod: CPTII,S$GLB,, | Performed by: INTERNAL MEDICINE

## 2023-11-17 PROCEDURE — 3079F DIAST BP 80-89 MM HG: CPT | Mod: CPTII,S$GLB,, | Performed by: INTERNAL MEDICINE

## 2023-11-17 PROCEDURE — 4010F PR ACE/ARB THEARPY RXD/TAKEN: ICD-10-PCS | Mod: CPTII,S$GLB,, | Performed by: INTERNAL MEDICINE

## 2023-11-17 PROCEDURE — 71046 X-RAY EXAM CHEST 2 VIEWS: CPT | Mod: TC

## 2023-11-17 PROCEDURE — 3079F PR MOST RECENT DIASTOLIC BLOOD PRESSURE 80-89 MM HG: ICD-10-PCS | Mod: CPTII,S$GLB,, | Performed by: INTERNAL MEDICINE

## 2023-11-17 PROCEDURE — 3074F PR MOST RECENT SYSTOLIC BLOOD PRESSURE < 130 MM HG: ICD-10-PCS | Mod: CPTII,S$GLB,, | Performed by: INTERNAL MEDICINE

## 2023-11-17 PROCEDURE — 99204 OFFICE O/P NEW MOD 45 MIN: CPT | Mod: S$GLB,,, | Performed by: INTERNAL MEDICINE

## 2023-11-17 PROCEDURE — 1159F PR MEDICATION LIST DOCUMENTED IN MEDICAL RECORD: ICD-10-PCS | Mod: CPTII,S$GLB,, | Performed by: INTERNAL MEDICINE

## 2023-11-17 PROCEDURE — 3066F NEPHROPATHY DOC TX: CPT | Mod: CPTII,S$GLB,, | Performed by: INTERNAL MEDICINE

## 2023-11-17 PROCEDURE — 99204 PR OFFICE/OUTPT VISIT, NEW, LEVL IV, 45-59 MIN: ICD-10-PCS | Mod: S$GLB,,, | Performed by: INTERNAL MEDICINE

## 2023-11-17 NOTE — ASSESSMENT & PLAN NOTE
Patient would benefit from weight loss and has tried to set realistic goals to achieve success. Lifestyle changes were discussed on eating healthy, exercising at least 150 minutes weekly, and reducing sedentary behavior.   Discussed the risk factors associated with obesity: Arthritis/MEGAN/Diabetes/Fatty Liver/Cardiovascular disease/GERD/HTN/HLP.

## 2023-11-17 NOTE — PATIENT INSTRUCTIONS
Your provider has scheduled you for a sleep study.   You should be receiving a phone call from the sleep lab shortly after your study has been approved by your insurance. Please make sure you have your current phone numbers in the [facility] system.     If you do not hear from anyone in the next 10 business days, please call the sleep lab at [phone] to schedule your sleep study.     The sleep studies are performed at [facility] seven nights a week.        After completion of sleep study, the pulmonary /Sleep navigator coordinator  will  make you a follow up appointment with your sleep provider after they have reviewed the results     This follow up appointment will be 10-14 days after your sleep study to review the results. If it is noted that you do have sleep apnea on your initial sleep study, you may receive a call back for a second night study with the CPAP before you come back to the office.     Followup appointments for results review would be best done VIRTUAL however inperson can also be done

## 2023-11-17 NOTE — ASSESSMENT & PLAN NOTE
11/17/2023     8:27 AM   EPWORTH SLEEPINESS SCALE   Sitting and reading 0   Watching TV 2   Sitting, inactive in a public place (e.g. a theatre or a meeting) 0   As a passenger in a car for an hour without a break 0   Lying down to rest in the afternoon when circumstances permit 2   Sitting and talking to someone 0   Sitting quietly after a lunch without alcohol 0   In a car, while stopped for a few minutes in traffic 0   Total score 4        Sleep study  Bring device for validation

## 2023-11-17 NOTE — PROGRESS NOTES
Pulmonary Outpatient  Visit     Subjective:       Patient ID: Yg Oh is a 53 y.o. male.    Social History     Tobacco Use   Smoking Status Never   Smokeless Tobacco Current            Chief Complaint: Sleep Apnea      Yg Oh is 53 y.o.  Referred by Georgette Argueta PA-C  [id number] [facility]  [place] [id number]   MEGAN on CPAP ? 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 wake up time 5:00 a.m.   Sleep latency 15-30 minutes x-ray notices next no sleep attacks.    No signs of narcolepsy  Grind her teeth at night   Take occasional naps              Review of Systems   Constitutional:  Positive for fatigue.   Respiratory:  Positive for apnea, snoring and somnolence.    Psychiatric/Behavioral:  Positive for sleep disturbance.    All other systems reviewed and are negative.      Outpatient Encounter Medications as of 11/17/2023   Medication Sig Dispense Refill    aspirin 325 MG tablet Take 325 mg by mouth once daily.      azelastine (ASTELIN) 137 mcg (0.1 %) nasal spray 1 spray (137 mcg total) by Nasal route 2 (two) times daily. 30 mL 0    blood sugar diagnostic Strp Use to check blood sugar twice daily 100 each 6    blood-glucose meter kit Use as instructed to check glucose twice daily 1 each 0    citalopram (CELEXA) 40 MG tablet Take 1 tablet (40 mg total) by mouth once daily. 90 tablet 3    clonazePAM (KLONOPIN) 1 MG tablet TAKE 1 TABLET BY MOUTH 2 TIMES DAILY AS NEEDED FOR ANXIETY. 60 tablet 5    fluticasone propionate (FLONASE) 50 mcg/actuation nasal spray 1 spray (50 mcg total) by Each Nostril route once daily. 9.9 mL 1    lancets (LANCETS,THIN) Misc Use to check blood sugar twice daily 100 each 6    lancets Misc Apply topically 2 (two) times daily.      losartan (COZAAR) 50 MG  tablet Take 1 tablet (50 mg total) by mouth once daily. 90 tablet 3    metFORMIN (GLUCOPHAGE) 500 MG tablet Take 2 tablets (1,000 mg total) by mouth 2 (two) times daily with meals. 360 tablet 3    metoprolol succinate (TOPROL-XL) 100 MG 24 hr tablet Take 1 tablet (100 mg total) by mouth once daily. 90 tablet 3    omeprazole (PRILOSEC) 20 MG capsule Take 1 capsule (20 mg total) by mouth once daily. 90 capsule 3    pravastatin (PRAVACHOL) 40 MG tablet Take 1 tablet (40 mg total) by mouth once daily. 90 tablet 3    semaglutide (OZEMPIC) 1 mg/dose (4 mg/3 mL) Inject 1 mg into the skin every 7 days. 3 mL 11     No facility-administered encounter medications on file as of 11/17/2023.       The following portions of the patient's history were reviewed and updated as appropriate: He  has a past medical history of Anxiety, Depression, Diabetes mellitus, type 2, GERD (gastroesophageal reflux disease), Hypertension, Morbid obesity, MEGAN on CPAP, and Personal history of colonic polyps.  He does not have any pertinent problems on file.  He  has a past surgical history that includes tka lef.  His family history is not on file.  He  reports that he has never smoked. He uses smokeless tobacco. He reports that he does not drink alcohol and does not use drugs.  He has a current medication list which includes the following prescription(s): aspirin, azelastine, blood sugar diagnostic, blood-glucose meter, citalopram, clonazepam, fluticasone propionate, lancets, lancets, losartan, metformin, metoprolol succinate, omeprazole, pravastatin, and ozempic.  Current Outpatient Medications on File Prior to Visit   Medication Sig Dispense Refill    aspirin 325 MG tablet Take 325 mg by mouth once daily.      azelastine (ASTELIN) 137 mcg (0.1 %) nasal spray 1 spray (137 mcg total) by Nasal route 2 (two) times daily. 30 mL 0    blood sugar diagnostic Strp Use to check blood sugar twice daily 100 each 6    blood-glucose meter kit Use as instructed to  check glucose twice daily 1 each 0    citalopram (CELEXA) 40 MG tablet Take 1 tablet (40 mg total) by mouth once daily. 90 tablet 3    clonazePAM (KLONOPIN) 1 MG tablet TAKE 1 TABLET BY MOUTH 2 TIMES DAILY AS NEEDED FOR ANXIETY. 60 tablet 5    fluticasone propionate (FLONASE) 50 mcg/actuation nasal spray 1 spray (50 mcg total) by Each Nostril route once daily. 9.9 mL 1    lancets (LANCETS,THIN) Misc Use to check blood sugar twice daily 100 each 6    lancets Misc Apply topically 2 (two) times daily.      losartan (COZAAR) 50 MG tablet Take 1 tablet (50 mg total) by mouth once daily. 90 tablet 3    metFORMIN (GLUCOPHAGE) 500 MG tablet Take 2 tablets (1,000 mg total) by mouth 2 (two) times daily with meals. 360 tablet 3    metoprolol succinate (TOPROL-XL) 100 MG 24 hr tablet Take 1 tablet (100 mg total) by mouth once daily. 90 tablet 3    omeprazole (PRILOSEC) 20 MG capsule Take 1 capsule (20 mg total) by mouth once daily. 90 capsule 3    pravastatin (PRAVACHOL) 40 MG tablet Take 1 tablet (40 mg total) by mouth once daily. 90 tablet 3    semaglutide (OZEMPIC) 1 mg/dose (4 mg/3 mL) Inject 1 mg into the skin every 7 days. 3 mL 11     No current facility-administered medications on file prior to visit.     He is allergic to prednisone..      BP Readings from Last 3 Encounters:   11/17/23 124/80   11/13/23 132/76   10/12/23 (!) 136/94     Snoring / Sleep:     [place] Questionnaire (validated MEGAN screening questionnaire)    YES -- Snoring/apnea    YES -- Fatigue    Body mass index is 50.07 kg/m².  (>25 is overweight, >30 is obese)    Blood Pressure = Hypertension  (PreHTN 120-139/80-89, Stg1 140-159/90-99, Stg2 >160/>100)  [place] = 3 of three MEGAN categories are positive (high risk is 2-3 positive categories)     [place] Sleepiness Scale TOTAL =    (validated sleepiness questionnaire with a higher score indicating greater sleepiness; range 0-24)      STOP-Bang Questionnaire (validated MEGAN screening questionnaire)  Negative  "unless checked off.  [x] Snoring    [x]  Tired/Fatigued/Sleepy  [x] Obstruction (apneas/choking)  [x] Pressure (HTN)  [x] BMI >35  [x] Age >50  [x] Neck >40 cm  [x] Gender male   STOP-Bang = 8 (low risk 0-2,high risk 3-8)    Neck circumference 20"           MMRC Dyspnea Scale (4 is worst)     [x] MMRC 0: Dyspneic on strenuous excercise (0 points)    [] MMRC 1: Dyspneic on walking a slight hill (0 points)    [] MMRC 2: Dyspneic on walking level ground; must stop occasionally due to breathlessness (1 point)    [] MMRC 3: Must stop for breathlessness after walking 100 yards or after a few minutes (2 points)    [] MMRC 4: Cannot leave house; breathless on dressing/undressing (3 points)                          No data to display                          Objective:     Vital Signs (Most Recent)  Vital Signs  Pulse: 98  Resp: 18  SpO2: 98 %  BP: 124/80  Height and Weight  Height: 6' 2" (188 cm)  Weight: (!) 176.9 kg (389 lb 15.9 oz)  BSA (Calculated - sq m): 3.04 sq meters  BMI (Calculated): 50.1  Weight in (lb) to have BMI = 25: 194.3]  Wt Readings from Last 2 Encounters:   11/17/23 (!) 176.9 kg (389 lb 15.9 oz)   11/13/23 (!) 177.1 kg (390 lb 7 oz)       Physical Exam  Vitals and nursing note reviewed.   Constitutional:       Appearance: Normal appearance. He is morbidly obese.      Comments: Neck 20"  Malampati score 4   HENT:      Head: Normocephalic and atraumatic.      Right Ear: Tympanic membrane normal.      Nose: Nose normal.   Eyes:      Pupils: Pupils are equal, round, and reactive to light.   Cardiovascular:      Rate and Rhythm: Normal rate and regular rhythm.      Pulses: Normal pulses.      Heart sounds: Normal heart sounds.   Pulmonary:      Effort: Pulmonary effort is normal.      Breath sounds: Normal breath sounds.   Abdominal:      General: Bowel sounds are normal.      Palpations: Abdomen is soft.   Musculoskeletal:         General: Normal range of motion.      Cervical back: Normal range of motion. " "  Skin:     General: Skin is warm and dry.      Capillary Refill: Capillary refill takes less than 2 seconds.   Neurological:      General: No focal deficit present.      Mental Status: He is alert and oriented to person, place, and time.   Psychiatric:         Mood and Affect: Mood normal.          Laboratory  Lab Results   Component Value Date    WBC 8.47 11/13/2023    RBC 5.42 11/13/2023    HGB 14.8 11/13/2023    HCT 47.4 11/13/2023    MCV 88 11/13/2023    MCH 27.3 11/13/2023    MCHC 31.2 (L) 11/13/2023    RDW 13.7 11/13/2023     11/13/2023    MPV 12.1 11/13/2023    GRAN 5.0 11/13/2023    GRAN 58.8 11/13/2023    LYMPH 2.5 11/13/2023    LYMPH 29.0 11/13/2023    MONO 0.7 11/13/2023    MONO 7.8 11/13/2023    EOS 0.3 11/13/2023    BASO 0.04 11/13/2023    EOSINOPHIL 3.5 11/13/2023    BASOPHIL 0.5 11/13/2023       BMP  Lab Results   Component Value Date     11/13/2023    K 4.3 11/13/2023     11/13/2023    CO2 23 11/13/2023    BUN 8 11/13/2023    CREATININE 1.0 11/13/2023    CALCIUM 9.9 11/13/2023    ANIONGAP 12 11/13/2023    AST 28 11/13/2023    ALT 27 11/13/2023    PROT 7.8 11/13/2023          No results found for: "IGE"     No results found for: "ASPERGILLUS"  No results found for: "AFUMIGATUSCL"     No results found for: "ACE"     Diagnostic Results:  I have personally reviewed today the following studies:    No image results found.       Assessment/Plan:     Problem List Items Addressed This Visit       Hypertension    MEGAN on CPAP         11/17/2023     8:27 AM   EPWORTH SLEEPINESS SCALE   Sitting and reading 0   Watching TV 2   Sitting, inactive in a public place (e.g. a theatre or a meeting) 0   As a passenger in a car for an hour without a break 0   Lying down to rest in the afternoon when circumstances permit 2   Sitting and talking to someone 0   Sitting quietly after a lunch without alcohol 0   In a car, while stopped for a few minutes in traffic 0   Total score 4        Sleep study  Bring " device for validation         BMI 50.0-59.9, adult     Other Visit Diagnoses       MEGAN (obstructive sleep apnea)    -  Primary    Relevant Orders    X-Ray Chest PA And Lateral    Polysomnogram (CPAP will be added if patient meets diagnostic criteria.)           Your provider has scheduled you for a sleep study.   You should be receiving a phone call from the sleep lab shortly after your study has been approved by your insurance. Please make sure you have your current phone numbers in the [facility] system.     If you do not hear from anyone in the next 10 business days, please call the sleep lab at [phone] to schedule your sleep study.     The sleep studies are performed at [facility] seven nights a week.        After completion of sleep study, the pulmonary /Sleep navigator coordinator  will  make you a follow up appointment with your sleep provider after they have reviewed the results     This follow up appointment will be 10-14 days after your sleep study to review the results. If it is noted that you do have sleep apnea on your initial sleep study, you may receive a call back for a second night study with the CPAP before you come back to the office.     Followup appointments for results review would be best done VIRTUAL however inperson can also be done          Follow up in about 4 weeks (around 12/15/2023), or PSG, weight loss, CXR today, CPAp download Monday.    This note was prepared using voice recognition system and is likely to have sound alike errors that may have been overlooked even after proof reading.  Please call me with any questions    Discussed diagnosis, its evaluation, treatment and usual course. All questions answered.    Thank you for the courtesy of participating in the care of this patient    Danilo Benito MD      Personal Diagnostic Review  []  CXR    []  ECHO    []  ONSAT    []  6MWD    []  LABS    []  CHEST CT    []  PET CT    []  Biopsy results

## 2023-11-20 ENCOUNTER — PATIENT MESSAGE (OUTPATIENT)
Dept: OPHTHALMOLOGY | Facility: CLINIC | Age: 53
End: 2023-11-20

## 2023-11-20 ENCOUNTER — OFFICE VISIT (OUTPATIENT)
Dept: OPHTHALMOLOGY | Facility: CLINIC | Age: 53
End: 2023-11-20
Payer: COMMERCIAL

## 2023-11-20 DIAGNOSIS — E11.9 TYPE 2 DIABETES MELLITUS WITHOUT RETINOPATHY: Primary | ICD-10-CM

## 2023-11-20 DIAGNOSIS — E11.9 TYPE 2 DIABETES MELLITUS WITHOUT COMPLICATION, WITHOUT LONG-TERM CURRENT USE OF INSULIN: ICD-10-CM

## 2023-11-20 DIAGNOSIS — H52.03 HYPEROPIA WITH PRESBYOPIA, BILATERAL: ICD-10-CM

## 2023-11-20 DIAGNOSIS — H52.4 HYPEROPIA WITH PRESBYOPIA, BILATERAL: ICD-10-CM

## 2023-11-20 PROCEDURE — 92015 PR REFRACTION: ICD-10-PCS | Mod: S$GLB,,, | Performed by: OPTOMETRIST

## 2023-11-20 PROCEDURE — 2023F DILAT RTA XM W/O RTNOPTHY: CPT | Mod: CPTII,S$GLB,, | Performed by: OPTOMETRIST

## 2023-11-20 PROCEDURE — 3061F PR NEG MICROALBUMINURIA RESULT DOCUMENTED/REVIEW: ICD-10-PCS | Mod: CPTII,S$GLB,, | Performed by: OPTOMETRIST

## 2023-11-20 PROCEDURE — 99999 PR PBB SHADOW E&M-EST. PATIENT-LVL III: ICD-10-PCS | Mod: PBBFAC,,, | Performed by: OPTOMETRIST

## 2023-11-20 PROCEDURE — 92015 DETERMINE REFRACTIVE STATE: CPT | Mod: S$GLB,,, | Performed by: OPTOMETRIST

## 2023-11-20 PROCEDURE — 1159F PR MEDICATION LIST DOCUMENTED IN MEDICAL RECORD: ICD-10-PCS | Mod: CPTII,S$GLB,, | Performed by: OPTOMETRIST

## 2023-11-20 PROCEDURE — 3066F PR DOCUMENTATION OF TREATMENT FOR NEPHROPATHY: ICD-10-PCS | Mod: CPTII,S$GLB,, | Performed by: OPTOMETRIST

## 2023-11-20 PROCEDURE — 3061F NEG MICROALBUMINURIA REV: CPT | Mod: CPTII,S$GLB,, | Performed by: OPTOMETRIST

## 2023-11-20 PROCEDURE — 92004 COMPRE OPH EXAM NEW PT 1/>: CPT | Mod: S$GLB,,, | Performed by: OPTOMETRIST

## 2023-11-20 PROCEDURE — 3066F NEPHROPATHY DOC TX: CPT | Mod: CPTII,S$GLB,, | Performed by: OPTOMETRIST

## 2023-11-20 PROCEDURE — 3052F HG A1C>EQUAL 8.0%<EQUAL 9.0%: CPT | Mod: CPTII,S$GLB,, | Performed by: OPTOMETRIST

## 2023-11-20 PROCEDURE — 1159F MED LIST DOCD IN RCRD: CPT | Mod: CPTII,S$GLB,, | Performed by: OPTOMETRIST

## 2023-11-20 PROCEDURE — 1160F RVW MEDS BY RX/DR IN RCRD: CPT | Mod: CPTII,S$GLB,, | Performed by: OPTOMETRIST

## 2023-11-20 PROCEDURE — 4010F ACE/ARB THERAPY RXD/TAKEN: CPT | Mod: CPTII,S$GLB,, | Performed by: OPTOMETRIST

## 2023-11-20 PROCEDURE — 1160F PR REVIEW ALL MEDS BY PRESCRIBER/CLIN PHARMACIST DOCUMENTED: ICD-10-PCS | Mod: CPTII,S$GLB,, | Performed by: OPTOMETRIST

## 2023-11-20 PROCEDURE — 4010F PR ACE/ARB THEARPY RXD/TAKEN: ICD-10-PCS | Mod: CPTII,S$GLB,, | Performed by: OPTOMETRIST

## 2023-11-20 PROCEDURE — 3052F PR MOST RECENT HEMOGLOBIN A1C LEVEL 8.0 - < 9.0%: ICD-10-PCS | Mod: CPTII,S$GLB,, | Performed by: OPTOMETRIST

## 2023-11-20 PROCEDURE — 2023F PR DILATED RETINAL EXAM W/O EVID OF RETINOPATHY: ICD-10-PCS | Mod: CPTII,S$GLB,, | Performed by: OPTOMETRIST

## 2023-11-20 PROCEDURE — 99999 PR PBB SHADOW E&M-EST. PATIENT-LVL III: CPT | Mod: PBBFAC,,, | Performed by: OPTOMETRIST

## 2023-11-20 PROCEDURE — 92004 PR EYE EXAM, NEW PATIENT,COMPREHESV: ICD-10-PCS | Mod: S$GLB,,, | Performed by: OPTOMETRIST

## 2023-11-20 NOTE — PROGRESS NOTES
HPI     Annual Exam            Comments: Pt states he has noticed that his vision has become more blurry   near. Pt states he is currently using otc readers in a +2.50-+3.00. pt   states his blood sugar and A1C are stable. Pt denies any pain or   irritation.         Last edited by Meg Martinez on 11/20/2023  8:13 AM.            Assessment /Plan     For exam results, see Encounter Report.    Type 2 diabetes mellitus without retinopathy  There was no diabetic retinopathy present in either eye today.   Recommended that pt continue care with PCP and/or specialists regarding diabetes.  Follow-up dilated eye exam recommended in 12 months, sooner with any vision changes or new concerns.    Hyperopia with presbyopia, bilateral  Spec Rx is optional, okay to continue with OTC Readers  Eyeglass Final Rx       Eyeglass Final Rx         Sphere    Right +0.25    Left +0.50      Expiration Date: 11/20/2024                  Normal gOCT today with nice, symmetric GCL OU    RTC 1 yr for dilated eye exam or PRN if any problems.   Discussed above and answered questions.

## 2023-11-20 NOTE — TELEPHONE ENCOUNTER
Yg Oh Summary Report  Patient ID:  YOB: 1970  Setup Date: 11/20/2023  Date Range:  Device:  Mask:  10/21/2023 - 11/19/2023  REMstar Pro (System One 60 Series) V3.04 (460P) [id number]  Patient Reference:  [facility]  [phone]  [address] [place] [id number]  Phone Number:  Address:  Phone Number:  Linked Lexx:  Care Team  [facility], [facility] [address] [place] [address] [id number] [phone]  Compliance Information 10/21/2023 - 11/19/2023  Compliance Summary  10/21/2023 - 11/19/2023 (30 days)  Days with Device Usage 30 days  Days without Device Usage 0 days  Percent Days with Device Usage 100.0%  Cumulative Usage 10 days 23 hrs. 43 mins. 21 secs.  Maximum Usage (1 Day) 13 hrs. 27 mins. 24 secs.  Average Usage (All Days) 8 hrs. 47 mins. 26 secs.  Average Usage (Days Used) 8 hrs. 47 mins. 26 secs.  Minimum Usage (1 Day) 6 hrs. 24 mins. 59 secs.  Percent of Days with Usage >= 4 Hours 100.0%  Percent of Days with Usage < 4 Hours 0.0%  Date Range  Total Blower Time 10 days 23 hrs. 48 mins. 21 secs.  CPAP Summary ([facility])  Average Time in Large Leak Per Day 6 mins. 8 secs.  Average AHI 0.6  CPAP 9.0 cmH2O

## 2023-12-27 DIAGNOSIS — J06.9 VIRAL URI WITH COUGH: ICD-10-CM

## 2023-12-27 DIAGNOSIS — E11.65 TYPE 2 DIABETES MELLITUS WITH HYPERGLYCEMIA, WITHOUT LONG-TERM CURRENT USE OF INSULIN: ICD-10-CM

## 2023-12-27 NOTE — TELEPHONE ENCOUNTER
No care due was identified.  [facility] Embedded Care Due Messages. Reference number: [id number].   12/27/2023 4:47:54 PM CST

## 2023-12-28 RX ORDER — FLUTICASONE PROPIONATE 50 MCG
SPRAY, SUSPENSION (ML) NASAL
Qty: 16 ML | Refills: 1 | Status: SHIPPED | OUTPATIENT
Start: 2023-12-28

## 2023-12-28 RX ORDER — METFORMIN HYDROCHLORIDE 500 MG/1
500 TABLET ORAL 2 TIMES DAILY WITH MEALS
Qty: 180 TABLET | Refills: 1 | OUTPATIENT
Start: 2023-12-28

## 2023-12-28 NOTE — TELEPHONE ENCOUNTER
Refill Decision Note   Yg Oh  is requesting a refill authorization.  Brief Assessment and Rationale for Refill:  Quick Discontinue     Medication Therapy Plan:  Dose changed to 2 tablets(1000 mg) by mouth 2 times daily by Georgetet Argueta PA-C on 10/12/23; [facility]      Comments:     Note composed:10:34 AM 12/28/2023

## 2024-01-17 ENCOUNTER — PATIENT MESSAGE (OUTPATIENT)
Dept: INTERNAL MEDICINE | Facility: CLINIC | Age: 54
End: 2024-01-17
Payer: COMMERCIAL

## 2024-01-17 DIAGNOSIS — E11.9 TYPE 2 DIABETES MELLITUS WITHOUT COMPLICATION, WITHOUT LONG-TERM CURRENT USE OF INSULIN: Primary | ICD-10-CM

## 2024-01-18 RX ORDER — SEMAGLUTIDE 2.68 MG/ML
2 INJECTION, SOLUTION SUBCUTANEOUS
Qty: 3 ML | Refills: 11 | Status: SHIPPED | OUTPATIENT
Start: 2024-01-18 | End: 2025-01-17

## 2024-02-18 DIAGNOSIS — J06.9 VIRAL URI WITH COUGH: ICD-10-CM

## 2024-02-20 RX ORDER — AZELASTINE 1 MG/ML
1 SPRAY, METERED NASAL 2 TIMES DAILY
Qty: 90 ML | Refills: 3 | Status: SHIPPED | OUTPATIENT
Start: 2024-02-20

## 2024-02-20 NOTE — TELEPHONE ENCOUNTER
Refill Routing Note   Medication(s) are not appropriate for processing by [facility] for the following reason(s):        No active prescription written by provider    [facility] action(s):  Defer     Requires labs : Yes             Appointments  past 12m or future 3m with PCP    Date Provider   Last Visit   4/17/2023 Brennan Lainez MD   Next Visit   2/26/2024 Brennan Lainez MD   ED visits in past 90 days: 0        Note composed:12:48 PM 02/20/2024

## 2024-02-20 NOTE — TELEPHONE ENCOUNTER
Care Due:                  Date            Visit Type   Department     Provider  --------------------------------------------------------------------------------                                NEW PATIENT                              - OPEN       [facility] INTERNAL  Last Visit: 04-      [facility]   MEDICINE       Brennan Lainez                               -                              PRIMARY      [facility] INTERNAL  Next Visit: 02-      CARE ([facility])   MEDICINE       Brennan Lainez                                                            Last  Test          Frequency    Reason                     Performed    Due Date  --------------------------------------------------------------------------------    Lipid Panel.  12 months..  pravastatin..............  04- 04-    [facility] Embedded Care Due Messages. Reference number: [id number].   2/20/2024 11:54:36 AM CST

## 2024-02-21 DIAGNOSIS — E11.9 TYPE 2 DIABETES MELLITUS WITHOUT COMPLICATION: ICD-10-CM

## 2024-02-26 ENCOUNTER — OFFICE VISIT (OUTPATIENT)
Dept: INTERNAL MEDICINE | Facility: CLINIC | Age: 54
End: 2024-02-26
Payer: COMMERCIAL

## 2024-02-26 VITALS
OXYGEN SATURATION: 97 % | HEART RATE: 105 BPM | BODY MASS INDEX: 40.43 KG/M2 | TEMPERATURE: 99 F | WEIGHT: 315 LBS | SYSTOLIC BLOOD PRESSURE: 130 MMHG | HEIGHT: 74 IN | DIASTOLIC BLOOD PRESSURE: 88 MMHG | RESPIRATION RATE: 16 BRPM

## 2024-02-26 DIAGNOSIS — Z12.5 SCREENING FOR PROSTATE CANCER: ICD-10-CM

## 2024-02-26 DIAGNOSIS — G47.33 OSA (OBSTRUCTIVE SLEEP APNEA): ICD-10-CM

## 2024-02-26 DIAGNOSIS — E11.9 TYPE 2 DIABETES MELLITUS WITHOUT COMPLICATION, WITHOUT LONG-TERM CURRENT USE OF INSULIN: Primary | ICD-10-CM

## 2024-02-26 DIAGNOSIS — I10 PRIMARY HYPERTENSION: ICD-10-CM

## 2024-02-26 DIAGNOSIS — Z12.11 SCREEN FOR COLON CANCER: ICD-10-CM

## 2024-02-26 DIAGNOSIS — E66.01 MORBID OBESITY: ICD-10-CM

## 2024-02-26 DIAGNOSIS — R06.09 DOE (DYSPNEA ON EXERTION): ICD-10-CM

## 2024-02-26 DIAGNOSIS — F41.9 ANXIETY: ICD-10-CM

## 2024-02-26 DIAGNOSIS — E11.65 TYPE 2 DIABETES MELLITUS WITH HYPERGLYCEMIA, WITHOUT LONG-TERM CURRENT USE OF INSULIN: ICD-10-CM

## 2024-02-26 DIAGNOSIS — G47.33 OSA ON CPAP: ICD-10-CM

## 2024-02-26 PROCEDURE — 3079F DIAST BP 80-89 MM HG: CPT | Mod: CPTII,S$GLB,, | Performed by: FAMILY MEDICINE

## 2024-02-26 PROCEDURE — 99214 OFFICE O/P EST MOD 30 MIN: CPT | Mod: S$GLB,,, | Performed by: FAMILY MEDICINE

## 2024-02-26 PROCEDURE — 1159F MED LIST DOCD IN RCRD: CPT | Mod: CPTII,S$GLB,, | Performed by: FAMILY MEDICINE

## 2024-02-26 PROCEDURE — 4010F ACE/ARB THERAPY RXD/TAKEN: CPT | Mod: CPTII,S$GLB,, | Performed by: FAMILY MEDICINE

## 2024-02-26 PROCEDURE — 3072F LOW RISK FOR RETINOPATHY: CPT | Mod: CPTII,S$GLB,, | Performed by: FAMILY MEDICINE

## 2024-02-26 PROCEDURE — 99999 PR PBB SHADOW E&M-EST. PATIENT-LVL V: CPT | Mod: PBBFAC,,, | Performed by: FAMILY MEDICINE

## 2024-02-26 PROCEDURE — 3075F SYST BP GE 130 - 139MM HG: CPT | Mod: CPTII,S$GLB,, | Performed by: FAMILY MEDICINE

## 2024-02-26 PROCEDURE — 3008F BODY MASS INDEX DOCD: CPT | Mod: CPTII,S$GLB,, | Performed by: FAMILY MEDICINE

## 2024-02-26 NOTE — PATIENT INSTRUCTIONS
Prevnar 20 vaccine  via a pharmacy (new pneumonia vaccine)  Tetanus/whooping cough vaccine via pharmacy  Shingrix new shingles vaccine  via a pharmacy

## 2024-02-26 NOTE — PROGRESS NOTES
Subjective:      Patient ID: Yg Oh is a 53 y.o. male.    Chief Complaint: No chief complaint on file.    HPI dm on ozmpic  2mg incd a month ago. Juju well and  losing wt;still on metformi; home gluc 100-118 post meal 140s    Chr orantes wants to sched stress echo as in past no chest pain    Morb obeasity better w ozempic and thinking about gastric sleeve    Anxiety qhs clonaz    Past Medical History:   Diagnosis Date    Anxiety     Depression     Diabetes mellitus, type 2     GERD (gastroesophageal reflux disease)     Hypertension     Morbid obesity     MEGAN on CPAP     Personal history of colonic polyps       Past Surgical History:   Procedure Laterality Date    HERNIA REPAIR      tka lef      2010      Social History     Socioeconomic History    Marital status:    Tobacco Use    Smoking status: Never    Smokeless tobacco: Current     Types: Chew   Substance and Sexual Activity    Alcohol use: Never    Drug use: Never   Social History Narrative     . Moved to  from [place] spring 23    [facility]     Social Determinants of Health     Financial Resource Strain: Low Risk  (2/26/2024)    Overall Financial Resource Strain (CARDIA)     Difficulty of Paying Living Expenses: Not hard at all   Food Insecurity: No Food Insecurity (2/26/2024)    Hunger Vital Sign     Worried About Running Out of Food in the Last Year: Never true     Ran Out of Food in the Last Year: Never true   Transportation Needs: No Transportation Needs (2/26/2024)    PRAPARE - Transportation     Lack of Transportation (Medical): No     Lack of Transportation (Non-Medical): No   Physical Activity: Insufficiently Active (2/26/2024)    Exercise Vital Sign     Days of Exercise per Week: 3 days     Minutes of Exercise per Session: 30 min   Stress: Stress Concern Present (2/26/2024)    [employer] [place] [employer] - Occupational Stress Questionnaire     Feeling of Stress : Rather much   Social  Connections: Unknown (2/26/2024)    Social Connection and Isolation Panel [NHANES]     Frequency of Communication with Friends and Family: Three times a week     Frequency of Social Gatherings with Friends and Family: Patient declined     Active Member of Clubs or Organizations: Yes     Attends Club or Organization Meetings: Patient declined     Marital Status:    Housing Stability: Low Risk  (2/26/2024)    Housing Stability Vital Sign     Unable to Pay for Housing in the Last Year: No     Number of Places Lived in the Last Year: 1     Unstable Housing in the Last Year: No      Family History   Problem Relation Age of Onset    Cancer Mother         hodgkins dxd 30 yrs prior to death 1/24    Heart disease Father       Review of Systems    Cardiovascular: no chest pain  Chest: no shortness of breath  Abd: no abd pain  Remainder review of systems negative      Objective:     Physical Examgen nad  Cvrrr  Chest ctabilat  Assessment:         ICD-10-CM ICD-9-CM   1. Type 2 diabetes mellitus without complication, without long-term current use of insulin  E11.9 250.00   2. Primary hypertension  I10 401.9   3. MEGAN (obstructive sleep apnea)  G47.33 327.23   4. Morbid obesity  E66.01 278.01   5. Screening for prostate cancer  Z12.5 V76.44   6. Screen for colon cancer  Z12.11 V76.51    Chr orantes  Plan:      Orthost precaut  Exerc  Cont wt loss  Notify when bar surg referral desired  F/u sleep clinic    Lab and follow up after in 3 months Georgette       Consider d/c metform after 3 m lab if A1c low enough      Type 2 diabetes mellitus without complication, without long-term current use of insulin  -     Hemoglobin A1C; Future; Expected date: 05/26/2024  -     Comprehensive Metabolic Panel; Future; Expected date: 05/26/2024  -     Lipid Panel; Future; Expected date: 05/26/2024  -     Microalbumin/Creatinine Ratio, Urine; Future; Expected date: 05/26/2024  -     Vitamin B12; Future; Expected date: 05/26/2024    Primary  hypertension    MEGAN (obstructive sleep apnea)    Morbid obesity    Screening for prostate cancer  -     PSA, Screening; Future; Expected date: 05/26/2024    Screen for colon cancer  -     Ambulatory referral/consult to Endo Procedure ; Future; Expected date: 02/27/2024    Anxiety    MEGAN on CPAP    SCHULTE (dyspnea on exertion)  -     Stress Echo Which stress agent will be used? Treadmill Exercise; Color Flow Doppler? No; Future

## 2024-02-29 RX ORDER — PRAVASTATIN SODIUM 40 MG/1
40 TABLET ORAL DAILY
Qty: 90 TABLET | Refills: 0 | Status: SHIPPED | OUTPATIENT
Start: 2024-02-29 | End: 2024-05-27

## 2024-02-29 NOTE — TELEPHONE ENCOUNTER
Refill Decision Note   Yg Oh  is requesting a refill authorization.  Brief Assessment and Rationale for Refill:  Approve     Medication Therapy Plan:         Comments:     Note composed:1:30 PM 02/29/2024

## 2024-02-29 NOTE — TELEPHONE ENCOUNTER
No care due was identified.  [facility] Embedded Care Due Messages. Reference number: [id number].   2/29/2024 9:32:19 AM CST

## 2024-03-04 ENCOUNTER — PATIENT MESSAGE (OUTPATIENT)
Dept: PREADMISSION TESTING | Facility: HOSPITAL | Age: 54
End: 2024-03-04

## 2024-03-04 ENCOUNTER — HOSPITAL ENCOUNTER (OUTPATIENT)
Dept: PREADMISSION TESTING | Facility: HOSPITAL | Age: 54
Discharge: HOME | End: 2024-03-04
Attending: COLON & RECTAL SURGERY
Payer: COMMERCIAL

## 2024-03-04 DIAGNOSIS — Z12.11 SCREEN FOR COLON CANCER: ICD-10-CM

## 2024-03-06 ENCOUNTER — PATIENT MESSAGE (OUTPATIENT)
Dept: CARDIOLOGY | Facility: HOSPITAL | Age: 54
End: 2024-03-06
Payer: COMMERCIAL

## 2024-03-13 ENCOUNTER — TELEPHONE (OUTPATIENT)
Dept: CARDIOLOGY | Facility: HOSPITAL | Age: 54
End: 2024-03-13
Payer: COMMERCIAL

## 2024-05-10 NOTE — TELEPHONE ENCOUNTER
Care Due:                  Date            Visit Type   Department     Provider  --------------------------------------------------------------------------------                                EP -                              PRIMARY      [facility] INTERNAL  Last Visit: 02-      CARE ([facility])   MEDICINE       Brennan Lainez  Next Visit: None Scheduled  None         None Found                                                            Last  Test          Frequency    Reason                     Performed    Due Date  --------------------------------------------------------------------------------    Lipid Panel.  12 months..  pravastatin..............  04- 04-    [facility] Embedded Care Due Messages. Reference number: [id number].   5/10/2024 6:40:21 AM CDT

## 2024-05-13 ENCOUNTER — PATIENT OUTREACH (OUTPATIENT)
Dept: ADMINISTRATIVE | Facility: HOSPITAL | Age: 54
End: 2024-05-13
Payer: COMMERCIAL

## 2024-05-13 RX ORDER — CLONAZEPAM 1 MG/1
1 TABLET ORAL 2 TIMES DAILY PRN
Qty: 60 TABLET | Refills: 5 | Status: SHIPPED | OUTPATIENT
Start: 2024-05-13

## 2024-05-24 ENCOUNTER — TELEPHONE (OUTPATIENT)
Dept: INTERNAL MEDICINE | Facility: CLINIC | Age: 54
End: 2024-05-24
Payer: COMMERCIAL

## 2024-05-24 ENCOUNTER — PATIENT MESSAGE (OUTPATIENT)
Dept: ADMINISTRATIVE | Facility: HOSPITAL | Age: 54
End: 2024-05-24
Payer: COMMERCIAL

## 2024-05-24 NOTE — TELEPHONE ENCOUNTER
Attempted to call the pt to reschedule appt due to the provider not in clinic. Lvm stating appt will be canceled due to the provider not in clinic.\\AR

## 2024-05-26 NOTE — TELEPHONE ENCOUNTER
No care due was identified.  [facility] Embedded Care Due Messages. Reference number: [id number].   5/26/2024 7:31:09 AM CDT

## 2024-05-27 RX ORDER — PRAVASTATIN SODIUM 40 MG/1
40 TABLET ORAL
Qty: 90 TABLET | Refills: 0 | Status: SHIPPED | OUTPATIENT
Start: 2024-05-27

## 2024-05-27 NOTE — TELEPHONE ENCOUNTER
Refill Routing Note   Medication(s) are not appropriate for processing by [facility] for the following reason(s):        Required labs outdated    [facility] action(s):  Defer               Appointments  past 12m or future 3m with PCP    Date Provider   Last Visit   2/26/2024 Brennan Lainez MD   Next Visit   Visit date not found Brennan Lainez MD   ED visits in past 90 days: 0        Note composed:8:10 AM 05/27/2024

## 2024-06-25 ENCOUNTER — PATIENT OUTREACH (OUTPATIENT)
Dept: ADMINISTRATIVE | Facility: HOSPITAL | Age: 54
End: 2024-06-25
Payer: COMMERCIAL

## 2024-06-25 NOTE — PROGRESS NOTES
VBHM Score: 4     Colon Cancer Screening  Urine Screening  Hemoglobin A1c  Lipid Panel                       Health Maintenance Topic(s) Outreach Outcomes & Actions Taken:    Colorectal Cancer Screening - Outreach Outcomes & Actions Taken  : Overdue    Lab(s) - Outreach Outcomes & Actions Taken  : Overdue         Additional Notes:    Attempted to contact pt no ans, lvm,sent portal message             Care Management, Digital Medicine, and/or Education Referrals    OPCM Risk Score: 32.7         Next Steps - Referral Actions: No ans, sent portal message

## 2024-08-18 NOTE — TELEPHONE ENCOUNTER
Refill Routing Note   Medication(s) are not appropriate for processing by [facility] for the following reason(s):        Required labs outdated    [facility] action(s):  Defer     Requires labs : Yes             Appointments  past 12m or future 3m with PCP    Date Provider   Last Visit   2/26/2024 Brennan Lainez MD   Next Visit   Visit date not found Brennan Lainez MD   ED visits in past 90 days: 0        Note composed:4:59 PM 08/18/2024

## 2024-08-18 NOTE — TELEPHONE ENCOUNTER
Care Due:                  Date            Visit Type   Department     Provider  --------------------------------------------------------------------------------                                EP -                              PRIMARY      [facility] INTERNAL  Last Visit: 02-      CARE ([facility])   MEDICINE       Brennan Lainez  Next Visit: None Scheduled  None         None Found                                                            Last  Test          Frequency    Reason                     Performed    Due Date  --------------------------------------------------------------------------------    CMP.........  12 months..  pravastatin..............  11- 11-    Lipid Panel.  12 months..  pravastatin..............  04- 04-    [facility] Embedded Care Due Messages. Reference number: [id number].   8/18/2024 7:30:49 AM CDT

## 2024-08-20 RX ORDER — PRAVASTATIN SODIUM 40 MG/1
40 TABLET ORAL
Qty: 90 TABLET | Refills: 4 | Status: SHIPPED | OUTPATIENT
Start: 2024-08-20

## 2024-08-23 ENCOUNTER — PATIENT MESSAGE (OUTPATIENT)
Dept: ADMINISTRATIVE | Facility: HOSPITAL | Age: 54
End: 2024-08-23
Payer: COMMERCIAL

## 2024-08-26 ENCOUNTER — PATIENT MESSAGE (OUTPATIENT)
Dept: ADMINISTRATIVE | Facility: HOSPITAL | Age: 54
End: 2024-08-26
Payer: COMMERCIAL

## 2024-08-26 DIAGNOSIS — E11.9 TYPE 2 DIABETES MELLITUS WITHOUT COMPLICATION, UNSPECIFIED WHETHER LONG TERM INSULIN USE: ICD-10-CM

## 2024-08-28 ENCOUNTER — PATIENT OUTREACH (OUTPATIENT)
Dept: ADMINISTRATIVE | Facility: HOSPITAL | Age: 54
End: 2024-08-28
Payer: COMMERCIAL

## 2024-08-28 DIAGNOSIS — Z12.11 SCREENING FOR MALIGNANT NEOPLASM OF COLON: Primary | ICD-10-CM

## 2024-08-28 NOTE — PROGRESS NOTES
Replying to Campaign Questionnaire for Overdue HM: DM Eye Exam, Colonoscopy & Labs    Referral to Endo Procedure Schedulers placed.   Eye exam scheduled 11/21/2024.   Portal message sent to pt with appt information and to request dates to schedule his labs.

## 2024-09-25 DIAGNOSIS — K21.9 GASTROESOPHAGEAL REFLUX DISEASE, UNSPECIFIED WHETHER ESOPHAGITIS PRESENT: ICD-10-CM

## 2024-09-25 DIAGNOSIS — I10 PRIMARY HYPERTENSION: ICD-10-CM

## 2024-09-25 DIAGNOSIS — E11.65 TYPE 2 DIABETES MELLITUS WITH HYPERGLYCEMIA, WITHOUT LONG-TERM CURRENT USE OF INSULIN: ICD-10-CM

## 2024-09-25 DIAGNOSIS — F41.9 ANXIETY: ICD-10-CM

## 2024-09-25 RX ORDER — METOPROLOL SUCCINATE 100 MG/1
100 TABLET, EXTENDED RELEASE ORAL
Qty: 90 TABLET | Refills: 0 | Status: SHIPPED | OUTPATIENT
Start: 2024-09-25

## 2024-09-25 RX ORDER — CITALOPRAM 40 MG/1
40 TABLET, FILM COATED ORAL
Qty: 90 TABLET | Refills: 0 | Status: SHIPPED | OUTPATIENT
Start: 2024-09-25

## 2024-09-25 RX ORDER — LOSARTAN POTASSIUM 50 MG/1
50 TABLET ORAL
Qty: 90 TABLET | Refills: 0 | Status: SHIPPED | OUTPATIENT
Start: 2024-09-25

## 2024-09-25 RX ORDER — METFORMIN HYDROCHLORIDE 500 MG/1
1000 TABLET ORAL 2 TIMES DAILY WITH MEALS
Qty: 360 TABLET | Refills: 0 | Status: SHIPPED | OUTPATIENT
Start: 2024-09-25

## 2024-09-25 RX ORDER — OMEPRAZOLE 20 MG/1
20 CAPSULE, DELAYED RELEASE ORAL
Qty: 90 CAPSULE | Refills: 0 | Status: SHIPPED | OUTPATIENT
Start: 2024-09-25

## 2024-09-26 DIAGNOSIS — I10 PRIMARY HYPERTENSION: ICD-10-CM

## 2024-09-30 RX ORDER — LOSARTAN POTASSIUM 50 MG/1
50 TABLET ORAL
Qty: 90 TABLET | Refills: 1 | Status: SHIPPED | OUTPATIENT
Start: 2024-09-30

## 2024-10-03 ENCOUNTER — PATIENT MESSAGE (OUTPATIENT)
Dept: PREADMISSION TESTING | Facility: HOSPITAL | Age: 54
End: 2024-10-03
Payer: COMMERCIAL

## 2024-10-10 ENCOUNTER — HOSPITAL ENCOUNTER (OUTPATIENT)
Dept: PREADMISSION TESTING | Facility: HOSPITAL | Age: 54
Discharge: HOME | End: 2024-10-10
Attending: INTERNAL MEDICINE
Payer: COMMERCIAL

## 2024-10-10 DIAGNOSIS — Z12.11 SCREENING FOR MALIGNANT NEOPLASM OF COLON: ICD-10-CM

## 2024-11-12 RX ORDER — CLONAZEPAM 1 MG/1
1 TABLET ORAL 2 TIMES DAILY PRN
Qty: 60 TABLET | OUTPATIENT
Start: 2024-11-12

## 2024-11-12 NOTE — TELEPHONE ENCOUNTER
Care Due:                  Date            Visit Type   Department     Provider  --------------------------------------------------------------------------------                                EP -                              PRIMARY      [facility] INTERNAL  Last Visit: 02-      CARE ([facility])   MEDICINE       Brennan Lainez  Next Visit: None Scheduled  None         None Found                                                            Last  Test          Frequency    Reason                     Performed    Due Date  --------------------------------------------------------------------------------    CMP.........  12 months..  pravastatin..............  11- 11-    Lipid Panel.  12 months..  pravastatin..............  04- 04-    [facility] [employer] Embedded Care Due Messages. Reference number: [id number].   11/11/2024 6:10:12 PM CST

## 2024-11-18 ENCOUNTER — OFFICE VISIT (OUTPATIENT)
Dept: INTERNAL MEDICINE | Facility: CLINIC | Age: 54
End: 2024-11-18
Payer: COMMERCIAL

## 2024-11-18 ENCOUNTER — LAB VISIT (OUTPATIENT)
Dept: LAB | Facility: HOSPITAL | Age: 54
End: 2024-11-18
Attending: FAMILY MEDICINE
Payer: COMMERCIAL

## 2024-11-18 VITALS
WEIGHT: 315 LBS | DIASTOLIC BLOOD PRESSURE: 86 MMHG | BODY MASS INDEX: 40.43 KG/M2 | SYSTOLIC BLOOD PRESSURE: 132 MMHG | HEART RATE: 103 BPM | HEIGHT: 74 IN | TEMPERATURE: 99 F | OXYGEN SATURATION: 97 % | RESPIRATION RATE: 18 BRPM

## 2024-11-18 DIAGNOSIS — Z12.5 SCREENING FOR PROSTATE CANCER: ICD-10-CM

## 2024-11-18 DIAGNOSIS — F41.9 ANXIETY: ICD-10-CM

## 2024-11-18 DIAGNOSIS — Z12.11 SCREEN FOR COLON CANCER: ICD-10-CM

## 2024-11-18 DIAGNOSIS — I10 PRIMARY HYPERTENSION: Primary | ICD-10-CM

## 2024-11-18 DIAGNOSIS — E11.65 TYPE 2 DIABETES MELLITUS WITH HYPERGLYCEMIA, WITHOUT LONG-TERM CURRENT USE OF INSULIN: ICD-10-CM

## 2024-11-18 DIAGNOSIS — Z23 NEED FOR INFLUENZA VACCINATION: ICD-10-CM

## 2024-11-18 DIAGNOSIS — E66.01 MORBID OBESITY: ICD-10-CM

## 2024-11-18 DIAGNOSIS — Z23 NEED FOR IMMUNIZATION AGAINST COMBINATION OF INFECTIOUS DISEASES: ICD-10-CM

## 2024-11-18 DIAGNOSIS — E11.9 TYPE 2 DIABETES MELLITUS WITHOUT COMPLICATION, WITHOUT LONG-TERM CURRENT USE OF INSULIN: ICD-10-CM

## 2024-11-18 DIAGNOSIS — N64.4 MASTALGIA: ICD-10-CM

## 2024-11-18 LAB
ALBUMIN SERPL BCP-MCNC: 3.9 G/DL (ref 3.5–5.2)
ALP SERPL-CCNC: 106 U/L (ref 40–150)
ALT SERPL W/O P-5'-P-CCNC: 42 U/L (ref 10–44)
ANION GAP SERPL CALC-SCNC: 8 MMOL/L (ref 8–16)
AST SERPL-CCNC: 33 U/L (ref 10–40)
BILIRUB SERPL-MCNC: 0.3 MG/DL (ref 0.1–1)
BUN SERPL-MCNC: 11 MG/DL (ref 6–20)
CALCIUM SERPL-MCNC: 9.8 MG/DL (ref 8.7–10.5)
CHLORIDE SERPL-SCNC: 109 MMOL/L (ref 95–110)
CHOLEST SERPL-MCNC: 136 MG/DL (ref 120–199)
CHOLEST/HDLC SERPL: 3.7 {RATIO} (ref 2–5)
CO2 SERPL-SCNC: 24 MMOL/L (ref 23–29)
COMPLEXED PSA SERPL-MCNC: 1 NG/ML (ref 0–4)
CREAT SERPL-MCNC: 1 MG/DL (ref 0.5–1.4)
EST. GFR  (NO RACE VARIABLE): >60 ML/MIN/1.73 M^2
ESTIMATED AVG GLUCOSE: 126 MG/DL (ref 68–131)
GLUCOSE SERPL-MCNC: 122 MG/DL (ref 70–110)
HBA1C MFR BLD: 6 % (ref 4–5.6)
HDLC SERPL-MCNC: 37 MG/DL (ref 40–75)
HDLC SERPL: 27.2 % (ref 20–50)
LDLC SERPL CALC-MCNC: 68.6 MG/DL (ref 63–159)
NONHDLC SERPL-MCNC: 99 MG/DL
POTASSIUM SERPL-SCNC: 4.6 MMOL/L (ref 3.5–5.1)
PROT SERPL-MCNC: 7.7 G/DL (ref 6–8.4)
SODIUM SERPL-SCNC: 141 MMOL/L (ref 136–145)
TRIGL SERPL-MCNC: 152 MG/DL (ref 30–150)
VIT B12 SERPL-MCNC: 308 PG/ML (ref 210–950)

## 2024-11-18 PROCEDURE — 36415 COLL VENOUS BLD VENIPUNCTURE: CPT | Performed by: FAMILY MEDICINE

## 2024-11-18 PROCEDURE — 3008F BODY MASS INDEX DOCD: CPT | Mod: CPTII,S$GLB,, | Performed by: FAMILY MEDICINE

## 2024-11-18 PROCEDURE — 83036 HEMOGLOBIN GLYCOSYLATED A1C: CPT | Performed by: FAMILY MEDICINE

## 2024-11-18 PROCEDURE — 90471 IMMUNIZATION ADMIN: CPT | Mod: S$GLB,,, | Performed by: FAMILY MEDICINE

## 2024-11-18 PROCEDURE — 3072F LOW RISK FOR RETINOPATHY: CPT | Mod: CPTII,S$GLB,, | Performed by: FAMILY MEDICINE

## 2024-11-18 PROCEDURE — 99999 PR PBB SHADOW E&M-EST. PATIENT-LVL V: CPT | Mod: PBBFAC,,, | Performed by: FAMILY MEDICINE

## 2024-11-18 PROCEDURE — 1159F MED LIST DOCD IN RCRD: CPT | Mod: CPTII,S$GLB,, | Performed by: FAMILY MEDICINE

## 2024-11-18 PROCEDURE — 90715 TDAP VACCINE 7 YRS/> IM: CPT | Mod: S$GLB,,, | Performed by: FAMILY MEDICINE

## 2024-11-18 PROCEDURE — 80061 LIPID PANEL: CPT | Performed by: FAMILY MEDICINE

## 2024-11-18 PROCEDURE — 80053 COMPREHEN METABOLIC PANEL: CPT | Performed by: FAMILY MEDICINE

## 2024-11-18 PROCEDURE — 4010F ACE/ARB THERAPY RXD/TAKEN: CPT | Mod: CPTII,S$GLB,, | Performed by: FAMILY MEDICINE

## 2024-11-18 PROCEDURE — 3079F DIAST BP 80-89 MM HG: CPT | Mod: CPTII,S$GLB,, | Performed by: FAMILY MEDICINE

## 2024-11-18 PROCEDURE — 90656 IIV3 VACC NO PRSV 0.5 ML IM: CPT | Mod: S$GLB,,, | Performed by: FAMILY MEDICINE

## 2024-11-18 PROCEDURE — 82607 VITAMIN B-12: CPT | Performed by: FAMILY MEDICINE

## 2024-11-18 PROCEDURE — 90677 PCV20 VACCINE IM: CPT | Mod: S$GLB,,, | Performed by: FAMILY MEDICINE

## 2024-11-18 PROCEDURE — 3075F SYST BP GE 130 - 139MM HG: CPT | Mod: CPTII,S$GLB,, | Performed by: FAMILY MEDICINE

## 2024-11-18 PROCEDURE — 90472 IMMUNIZATION ADMIN EACH ADD: CPT | Mod: S$GLB,,, | Performed by: FAMILY MEDICINE

## 2024-11-18 PROCEDURE — 84153 ASSAY OF PSA TOTAL: CPT | Performed by: FAMILY MEDICINE

## 2024-11-18 PROCEDURE — 99214 OFFICE O/P EST MOD 30 MIN: CPT | Mod: 25,S$GLB,, | Performed by: FAMILY MEDICINE

## 2024-11-18 RX ORDER — CLONAZEPAM 1 MG/1
1 TABLET ORAL 2 TIMES DAILY PRN
Qty: 60 TABLET | Refills: 5 | Status: SHIPPED | OUTPATIENT
Start: 2024-11-18

## 2024-11-18 NOTE — PROGRESS NOTES
"Chief Complaint: Annual Exam    History of Present Illness    CHIEF COMPLAINT:  Mr. Oh presents today for follow-up and to discuss work-related stress.    ANXIETY AND STRESS:  He reports significant work-related stress, describing his stress levels as "off the charts" this summer due to high responsibility and long working hours at his job. He experiences anxiety symptoms, including a nervous feeling when not taking clonazepam, occasional headaches, and sleep disturbances. He wakes up around 2:30 or 3:00 AM, feeling ready to go or worrying about work. Clonazepam helps "take off some of the edge" and is effective at nighttime to "turn off" his mind without causing drowsiness. He expresses concern about rationing his clonazepam use, taking it primarily at night and hesitating to use it during the day.    MEDICATIONS:  He takes clonazepam for anxiety as needed, primarily in the mornings. For diabetes management, he uses Ozempic 2 mg weekly and metformin twice daily. Other medications include Celexa 40 mg, losartan, metoprolol, and pravastatin. He reports doing well with this regimen and notes that Ozempic helps maintain regular bowel movements. He may be due for a clonazepam refill but is unsure about the status of his other medications.    DIABETES MANAGEMENT:  He inquires about the possibility of increasing the Ozempic dosage or changing to a different medication. He mentions regularly checking his A1C level in the past and expresses interest in receiving flu and shingles vaccinations.    WEIGHT MANAGEMENT:  He reports significant weight loss, currently weighing approximately 380 lbs, down from nearly 410 lbs about a year and a half ago. He acknowledges experiencing a plateau in his weight loss progress and expresses a desire to continue losing weight, recognizing the need for increased exercise.    LEFT NIPPLE PAIN:  He reports left nipple pain present for approximately 3 months. The pain is localized to the " nipple itself, which he describes as tender and sensitive to touch, becoming particularly uncomfortable when bumped.    FAMILY HISTORY:  His father passed away approximately one year ago due to long-term illness and dementia, and his mother passed away in January of this year from metastatic lung cancer. He expresses that losing both parents within six months has been emotionally challenging. His father had a history of diabetes, which led to complications including toe infections and eventual leg amputation.    PAST MEDICAL HISTORY:  He reports a history of hernia repair in 2023 with good recovery and a previous occurrence of skin cancer on his arm.      ROS:  General: -fever, -chills, -fatigue, -weight gain, +weight loss  Eyes: -vision changes, -redness, -discharge  ENT: -ear pain, -nasal congestion, -sore throat  Cardiovascular: -chest pain, -palpitations, -lower extremity edema  Respiratory: -cough, -shortness of breath  Gastrointestinal: -abdominal pain, -nausea, -vomiting, -diarrhea, -constipation, -blood in stool  Genitourinary: -dysuria, -hematuria, -frequency  Musculoskeletal: -joint pain, -muscle pain  Skin: -rash, -lesion  Neurological: +headache, -dizziness, -numbness, -tingling  Psychiatric: +anxiety, -depression, -sleep difficulty          Objective:   Physical Exam    Vitals: Reviewed. Nursing note reviewed.  Constitutional: Alert.  HENT: Normocephalic. Atraumatic. External ears normal. Nose normal. PERRL. Conjunctivae normal.  Neck: ROM normal. Supple.  Cardiovascular: Normal rate and regular rhythm. Normal heart sounds.  Pulmonary: Normal breath sounds. Effort normal.  Abdominal: Bowel sounds are normal. Soft.  Musculoskeletal: ROM normal.  Skin: Warm. Dry.  Neurological: Oriented x3.  Psychiatric: Behavior normal. Thought content normal. Judgment normal.  Breasts: SLIGHT FULLNESS ABOVE AND LATERAL TO LEFT NIPPLE.       Assessment:       1. Primary hypertension    2. Type 2 diabetes mellitus with  hyperglycemia, without long-term current use of insulin    3. Morbid obesity    4. Screening for prostate cancer    5. Screen for colon cancer    6. Mastalgia    7. Need for immunization against combination of infectious diseases    8. Type 2 diabetes mellitus without complication, without long-term current use of insulin    9. Anxiety    10. Need for influenza vaccination        Plan:   Assessment & Plan    Evaluated patient's stress level and anxiety symptoms, considering impact on overall health  Assessed current medication regimen, including use of clonazepam for anxiety management  Reviewed blood pressure and diabetes management  Evaluated tender left nipple area; palpated nodule warranting further investigation  Considered patient's family history of cancer in decision-making process    PLAN SUMMARY:  Administered pneumonia vaccine in office  Ordered tetanus-whooping cough combination vaccine and flu vaccine  Refilled clonazepam for high-stress situations  Referred to breast surgeon for left breast nodule evaluation  Ordered diagnostic mammogram of left breast  Continued losartan, metformin, Ozempic, pravastatin, metoprolol, and Celexa  Ordered comprehensive lab work, including PSA  Follow up in 6 months for A1C check and routine evaluation  Contact office to schedule colonoscopy in coming months  Recommend incorporating exercise for weight loss    GENERALIZED ANXIETY DISORDER:  Discussed potential side effects of clonazepam, including drowsiness and short-term memory issues.  Continued Celexa 40mg daily.  Refilled clonazepam with instructions for use during high-stress situations, cautioning about potential drowsiness when driving.    STRESSFUL WORK SCHEDULE:  Mr. Oh to work towards achieving better work-life balance to reduce stress levels.    ESSENTIAL (PRIMARY) HYPERTENSION:  Continued losartan for blood pressure management.  Continued metoprolol for blood pressure management.    TYPE 2 DIABETES  MELLITUS:  Continued Ozempic 2mg weekly for diabetes management.  Continued metformin twice daily for diabetes management.    HYPERLIPIDEMIA:  Continued pravastatin for cholesterol management.    MORBID (SEVERE) OBESITY:  Recommend incorporating exercise to help break weight loss plateau.    OBSTRUCTIVE SLEEP APNEA:  Mr. Oh to continue efforts to maintain regular sleep schedule.    DIETARY COUNSELING:  Explained benefits of carbonated mineral water as an alternative to diet sodas.  Discussed dangers of consuming elevated-caffeine energy drinks.    SCREENING AND PREVENTION:  Comprehensive lab work ordered, including PSA.  Diagnostic mammogram of left breast area ordered.  Administered pneumonia vaccine in office.  Tetanus-whooping cough combination vaccine ordered.  Flu shot ordered.    LOCALIZED SWELLING, MASS AND LUMP:  Referred to breast surgeon for evaluation of left breast nodule.    FOLLOW-UP:  Follow up in 6 months for A1C check and routine evaluation.  Contact the office to discuss scheduling colonoscopy in the next few months.        Lab today  Lab and follow up after in 6 months Georgette; consider decrease metf in future    Resume exerc    Primary hypertension    Type 2 diabetes mellitus with hyperglycemia, without long-term current use of insulin  -     Hemoglobin A1C; Future; Expected date: 11/18/2024  -     Lipid Panel; Future; Expected date: 11/18/2024  -     Comprehensive Metabolic Panel; Future; Expected date: 11/18/2024  -     Microalbumin/Creatinine Ratio, Urine; Future  -     Vitamin B12; Future; Expected date: 11/18/2024  -     Hemoglobin A1C; Future; Expected date: 05/18/2025    Morbid obesity    Screening for prostate cancer  -     PSA, Screening; Future; Expected date: 11/18/2024    Screen for colon cancer  -     Ambulatory referral/consult to Endo Procedure ; Future; Expected date: 11/19/2024    Mastalgia  -     Mammo Digital Diagnostic Left; Future; Expected date: 11/18/2024  -      Ambulatory referral/consult to General Surgery; Future; Expected date: 11/25/2024    Need for immunization against combination of infectious diseases  -     pneumoc 20-consuelo conj-dip cr(PF) (PREVNAR-20 (PF)) injection Syrg 0.5 mL  -     DIPH,PERTUSS(ACEL),TET VAC(PF)(ADULT)(ADACEL)(TDaP)    Type 2 diabetes mellitus without complication, without long-term current use of insulin    Anxiety  -     clonazePAM (KLONOPIN) 1 MG tablet; Take 1 tablet (1 mg total) by mouth 2 (two) times daily as needed for Anxiety.  Dispense: 60 tablet; Refill: 5    Need for influenza vaccination  -     influenza (Flulaval, Fluzone, Fluarix) 45 mcg/0.5 mL IM vaccine (> or = 6 mo) 0.5 mL

## 2024-11-18 NOTE — PATIENT INSTRUCTIONS
Shingrix new shingles vaccine  via a pharmacy.Prevnar 20 vaccine  via a pharmacy (new pneumonia vaccine)  Tetanus/whooping cough vaccine via pharmacy  Recommend covid booster via pharmacy

## 2024-11-20 ENCOUNTER — TELEPHONE (OUTPATIENT)
Dept: INTERNAL MEDICINE | Facility: CLINIC | Age: 54
End: 2024-11-20
Payer: COMMERCIAL

## 2024-11-20 DIAGNOSIS — R80.9 TYPE 2 DIABETES MELLITUS WITH DIABETIC MICROALBUMINURIA, UNSPECIFIED WHETHER LONG TERM INSULIN USE: Primary | ICD-10-CM

## 2024-11-20 DIAGNOSIS — E11.29 TYPE 2 DIABETES MELLITUS WITH DIABETIC MICROALBUMINURIA, UNSPECIFIED WHETHER LONG TERM INSULIN USE: Primary | ICD-10-CM

## 2024-11-22 ENCOUNTER — HOSPITAL ENCOUNTER (OUTPATIENT)
Dept: PREADMISSION TESTING | Facility: HOSPITAL | Age: 54
Discharge: HOME | End: 2024-11-22
Attending: FAMILY MEDICINE
Payer: COMMERCIAL

## 2024-11-22 DIAGNOSIS — Z12.11 SCREEN FOR COLON CANCER: ICD-10-CM

## 2024-11-25 ENCOUNTER — LAB VISIT (OUTPATIENT)
Dept: LAB | Facility: HOSPITAL | Age: 54
End: 2024-11-25
Attending: SURGERY
Payer: COMMERCIAL

## 2024-11-25 ENCOUNTER — OFFICE VISIT (OUTPATIENT)
Dept: SURGERY | Facility: CLINIC | Age: 54
End: 2024-11-25
Payer: COMMERCIAL

## 2024-11-25 VITALS — WEIGHT: 315 LBS | BODY MASS INDEX: 40.43 KG/M2 | HEIGHT: 74 IN

## 2024-11-25 DIAGNOSIS — N64.4 MASTALGIA: ICD-10-CM

## 2024-11-25 DIAGNOSIS — N62 GYNECOMASTIA: ICD-10-CM

## 2024-11-25 DIAGNOSIS — N62 GYNECOMASTIA: Primary | ICD-10-CM

## 2024-11-25 LAB
FSH SERPL-ACNC: 5.14 MIU/ML (ref 0.95–11.95)
LH SERPL-ACNC: 3.4 MIU/ML (ref 0.6–12.1)
T4 FREE SERPL-MCNC: 0.92 NG/DL (ref 0.71–1.51)
TESTOST SERPL-MCNC: 319 NG/DL (ref 304–1227)
TSH SERPL DL<=0.005 MIU/L-ACNC: 0.81 UIU/ML (ref 0.4–4)

## 2024-11-25 PROCEDURE — 99203 OFFICE O/P NEW LOW 30 MIN: CPT | Mod: S$GLB,,, | Performed by: SURGERY

## 2024-11-25 PROCEDURE — 36415 COLL VENOUS BLD VENIPUNCTURE: CPT | Performed by: SURGERY

## 2024-11-25 PROCEDURE — 3044F HG A1C LEVEL LT 7.0%: CPT | Mod: CPTII,S$GLB,, | Performed by: SURGERY

## 2024-11-25 PROCEDURE — 3066F NEPHROPATHY DOC TX: CPT | Mod: CPTII,S$GLB,, | Performed by: SURGERY

## 2024-11-25 PROCEDURE — 83002 ASSAY OF GONADOTROPIN (LH): CPT | Performed by: SURGERY

## 2024-11-25 PROCEDURE — 84443 ASSAY THYROID STIM HORMONE: CPT | Performed by: SURGERY

## 2024-11-25 PROCEDURE — 83001 ASSAY OF GONADOTROPIN (FSH): CPT | Performed by: SURGERY

## 2024-11-25 PROCEDURE — 1159F MED LIST DOCD IN RCRD: CPT | Mod: CPTII,S$GLB,, | Performed by: SURGERY

## 2024-11-25 PROCEDURE — 84403 ASSAY OF TOTAL TESTOSTERONE: CPT | Performed by: SURGERY

## 2024-11-25 PROCEDURE — 84702 CHORIONIC GONADOTROPIN TEST: CPT | Performed by: SURGERY

## 2024-11-25 PROCEDURE — 99999 PR PBB SHADOW E&M-EST. PATIENT-LVL III: CPT | Mod: PBBFAC,,, | Performed by: SURGERY

## 2024-11-25 PROCEDURE — 84439 ASSAY OF FREE THYROXINE: CPT | Performed by: SURGERY

## 2024-11-25 PROCEDURE — 3060F POS MICROALBUMINURIA REV: CPT | Mod: CPTII,S$GLB,, | Performed by: SURGERY

## 2024-11-25 PROCEDURE — 3008F BODY MASS INDEX DOCD: CPT | Mod: CPTII,S$GLB,, | Performed by: SURGERY

## 2024-11-25 PROCEDURE — 3072F LOW RISK FOR RETINOPATHY: CPT | Mod: CPTII,S$GLB,, | Performed by: SURGERY

## 2024-11-25 PROCEDURE — 4010F ACE/ARB THERAPY RXD/TAKEN: CPT | Mod: CPTII,S$GLB,, | Performed by: SURGERY

## 2024-11-27 LAB — B-HCG SERPL-ACNC: <0.6 IU/L

## 2024-12-02 PROBLEM — G47.33 OSA ON CPAP: Chronic | Status: ACTIVE | Noted: 2023-04-17

## 2024-12-02 NOTE — PROGRESS NOTES
History & Physical    Subjective     History of Present Illness:  Patient is a 54 y.o. male reports left sided nipple/breast pain.  This has been going on for 3 months.  He has noticed an enlargement of the left-sided breast tissue.  Denies starting any new medications or supplements    No chief complaint on file.      Review of patient's allergies indicates:   Allergen Reactions    Prednisone        Current Outpatient Medications   Medication Sig Dispense Refill    azelastine (ASTELIN) 137 mcg (0.1 %) nasal spray SPRAY 1 SPRAY BY NASAL ROUTE 2 TIMES DAILY. 90 mL 3    blood sugar diagnostic Strp Use to check blood sugar twice daily 100 each 6    citalopram (CELEXA) 40 MG tablet TAKE 1 TABLET BY MOUTH EVERY DAY 90 tablet 0    clonazePAM (KLONOPIN) 1 MG tablet Take 1 tablet (1 mg total) by mouth 2 (two) times daily as needed for Anxiety. 60 tablet 5    fluticasone propionate (FLONASE) 50 mcg/actuation nasal spray USE 1 SPRAY (50 MCG TOTAL) IN EACH NOSTRIL ONCE DAILY 16 mL 1    lancets (LANCETS,THIN) Misc Use to check blood sugar twice daily 100 each 6    lancets Misc Apply topically 2 (two) times daily.      losartan (COZAAR) 50 MG tablet TAKE 1 TABLET BY MOUTH EVERY DAY 90 tablet 1    metFORMIN (GLUCOPHAGE) 500 MG tablet TAKE 2 TABLETS BY MOUTH TWICE A DAY WITH MEALS 360 tablet 0    metoprolol succinate (TOPROL-XL) 100 MG 24 hr tablet TAKE 1 TABLET BY MOUTH EVERY DAY 90 tablet 0    omeprazole (PRILOSEC) 20 MG capsule TAKE 1 CAPSULE BY MOUTH EVERY DAY 90 capsule 0    pravastatin (PRAVACHOL) 40 MG tablet TAKE 1 TABLET BY MOUTH EVERY DAY 90 tablet 4    semaglutide (OZEMPIC) 2 mg/dose (8 mg/3 mL) PnIj Inject 2 mg into the skin every 7 days. 3 mL 11    blood-glucose meter kit Use as instructed to check glucose twice daily 1 each 0     No current facility-administered medications for this visit.       Past Medical History:   Diagnosis Date    Anxiety     Depression     Diabetes mellitus, type 2     GERD (gastroesophageal  "reflux disease)     Hypertension     Morbid obesity     MEGAN on CPAP     Personal history of colonic polyps      Past Surgical History:   Procedure Laterality Date    HERNIA REPAIR      [facility] lef      2010     Family History   Problem Relation Name Age of Onset    Cancer Mother          hodgkins dxd 30 yrs prior to death 1/24    Heart disease Father       Social History     Tobacco Use    Smoking status: Never    Smokeless tobacco: Current     Types: Chew   Substance Use Topics    Alcohol use: Never    Drug use: Never        Review of Systems:  Review of Systems   Constitutional:  Negative for activity change, chills, fever and unexpected weight change.   HENT:  Negative for congestion, hearing loss, rhinorrhea and trouble swallowing.    Eyes:  Negative for discharge and visual disturbance.   Respiratory:  Negative for chest tightness, shortness of breath and wheezing.    Cardiovascular:  Negative for chest pain and palpitations.   Gastrointestinal:  Negative for abdominal distention, abdominal pain, blood in stool, constipation, diarrhea, nausea, rectal pain and vomiting.   Endocrine: Negative for polydipsia and polyuria.   Genitourinary:  Negative for difficulty urinating, dysuria, hematuria and urgency.   Musculoskeletal:  Negative for arthralgias, joint swelling and neck pain.   Skin:  Negative for rash.   Neurological:  Negative for weakness, light-headedness and headaches.   Hematological:  Negative for adenopathy.   Psychiatric/Behavioral:  Negative for confusion and dysphoric mood.           Objective     Vital Signs (Most Recent)     6' 2" (1.88 m)  (!) 175 kg (385 lb 12.9 oz)     Physical Exam:  Physical Exam  Vitals reviewed.   Constitutional:       Appearance: He is well-developed. He is obese.   HENT:      Head: Normocephalic and atraumatic.   Cardiovascular:      Rate and Rhythm: Normal rate and regular rhythm.   Pulmonary:      Effort: Pulmonary effort is normal.      Breath sounds: Normal breath sounds. "   Chest:   Breasts:     Right: No swelling, bleeding, inverted nipple, mass, nipple discharge, skin change or tenderness.      Left: Tenderness (mild) present. No swelling, bleeding, inverted nipple, mass, nipple discharge or skin change.          Comments: Asymmetry of left breast enlargement compared to right breast  Abdominal:      General: Bowel sounds are normal. There is no distension.      Palpations: Abdomen is soft.      Tenderness: There is no abdominal tenderness.   Musculoskeletal:      Cervical back: Normal range of motion and neck supple.   Skin:     General: Skin is warm and dry.   Neurological:      Mental Status: He is alert and oriented to person, place, and time.                Assessment and Plan     54-year-old male with new onset left breast enlargement/discomfort    PLAN:  Discussed potential causes of breast enlargement:  Gynecomastia spontaneous versus secondary from medications/supplements, malignancy, infection versus other  No dominant mass on exam, breast imaging for further evaluation   Laboratory testing for evaluation  Discussed potential treatment pending results further recommendations to follow    30 minutes of total time spent on the encounter, which includes face to face time and non-face to face time preparing to see the patient (eg, review of tests), Obtaining and/or reviewing separately obtained history, Documenting clinical information in the electronic or other health record, Independently interpreting results (not separately reported) and communicating results to the patient/family/caregiver, or Care coordination (not separately reported).

## 2024-12-03 ENCOUNTER — TELEPHONE (OUTPATIENT)
Dept: SURGERY | Facility: CLINIC | Age: 54
End: 2024-12-03
Payer: COMMERCIAL

## 2024-12-06 DIAGNOSIS — E11.9 TYPE 2 DIABETES MELLITUS WITHOUT COMPLICATION, WITHOUT LONG-TERM CURRENT USE OF INSULIN: ICD-10-CM

## 2024-12-12 RX ORDER — SEMAGLUTIDE 2.68 MG/ML
2 INJECTION, SOLUTION SUBCUTANEOUS
Qty: 3 ML | Refills: 11 | Status: SHIPPED | OUTPATIENT
Start: 2024-12-12 | End: 2025-12-12

## 2024-12-21 DIAGNOSIS — I10 PRIMARY HYPERTENSION: ICD-10-CM

## 2024-12-21 DIAGNOSIS — K21.9 GASTROESOPHAGEAL REFLUX DISEASE, UNSPECIFIED WHETHER ESOPHAGITIS PRESENT: ICD-10-CM

## 2024-12-21 DIAGNOSIS — E11.65 TYPE 2 DIABETES MELLITUS WITH HYPERGLYCEMIA, WITHOUT LONG-TERM CURRENT USE OF INSULIN: ICD-10-CM

## 2024-12-21 DIAGNOSIS — F41.9 ANXIETY: ICD-10-CM

## 2024-12-21 NOTE — TELEPHONE ENCOUNTER
No care due was identified.  [facility] Embedded Care Due Messages. Reference number: [id number].   12/21/2024 12:52:26 PM CST

## 2024-12-23 RX ORDER — PRAVASTATIN SODIUM 40 MG/1
40 TABLET ORAL DAILY
Qty: 90 TABLET | Refills: 3 | Status: SHIPPED | OUTPATIENT
Start: 2024-12-23

## 2024-12-23 NOTE — TELEPHONE ENCOUNTER
Refill Decision Note   Yg Oh  is requesting a refill authorization.  Brief Assessment and Rationale for Refill:  Approve     Medication Therapy Plan:         Comments:     Note composed:9:39 AM 12/23/2024

## 2024-12-26 RX ORDER — METOPROLOL SUCCINATE 100 MG/1
100 TABLET, EXTENDED RELEASE ORAL DAILY
Qty: 90 TABLET | Refills: 1 | Status: SHIPPED | OUTPATIENT
Start: 2024-12-26

## 2024-12-26 RX ORDER — METFORMIN HYDROCHLORIDE 500 MG/1
1000 TABLET ORAL 2 TIMES DAILY WITH MEALS
Qty: 360 TABLET | Refills: 1 | Status: SHIPPED | OUTPATIENT
Start: 2024-12-26

## 2024-12-26 RX ORDER — OMEPRAZOLE 20 MG/1
20 CAPSULE, DELAYED RELEASE ORAL DAILY
Qty: 90 CAPSULE | Refills: 1 | Status: SHIPPED | OUTPATIENT
Start: 2024-12-26

## 2024-12-26 RX ORDER — LOSARTAN POTASSIUM 50 MG/1
50 TABLET ORAL DAILY
Qty: 90 TABLET | Refills: 1 | Status: SHIPPED | OUTPATIENT
Start: 2024-12-26 | End: 2025-06-24

## 2024-12-26 RX ORDER — CITALOPRAM 40 MG/1
40 TABLET, FILM COATED ORAL DAILY
Qty: 90 TABLET | Refills: 3 | Status: SHIPPED | OUTPATIENT
Start: 2024-12-26

## 2024-12-30 ENCOUNTER — PATIENT OUTREACH (OUTPATIENT)
Dept: ADMINISTRATIVE | Facility: HOSPITAL | Age: 54
End: 2024-12-30
Payer: COMMERCIAL

## 2025-01-03 DIAGNOSIS — Z00.00 ROUTINE GENERAL MEDICAL EXAMINATION AT A HEALTH CARE FACILITY: Primary | ICD-10-CM

## 2025-03-31 ENCOUNTER — PATIENT OUTREACH (OUTPATIENT)
Dept: ADMINISTRATIVE | Facility: HOSPITAL | Age: 55
End: 2025-03-31
Payer: COMMERCIAL

## 2025-03-31 DIAGNOSIS — Z12.11 COLON CANCER SCREENING: Primary | ICD-10-CM

## 2025-03-31 NOTE — PROGRESS NOTES
[facility] OUTREACH: per chart review pt is OVERDUE for colon cancer screen-spoke to pt he agreed to cologuard, DM eye exam also needed, pt agreed to scheduling follow up.

## 2025-05-12 ENCOUNTER — LAB VISIT (OUTPATIENT)
Dept: LAB | Facility: HOSPITAL | Age: 55
End: 2025-05-12
Attending: FAMILY MEDICINE
Payer: COMMERCIAL

## 2025-05-12 DIAGNOSIS — E11.65 TYPE 2 DIABETES MELLITUS WITH HYPERGLYCEMIA, WITHOUT LONG-TERM CURRENT USE OF INSULIN: ICD-10-CM

## 2025-05-12 LAB
EAG (OHS): 134 MG/DL (ref 68–131)
HBA1C MFR BLD: 6.3 % (ref 4–5.6)

## 2025-05-12 PROCEDURE — 83036 HEMOGLOBIN GLYCOSYLATED A1C: CPT

## 2025-05-12 PROCEDURE — 36415 COLL VENOUS BLD VENIPUNCTURE: CPT

## 2025-05-19 ENCOUNTER — PATIENT MESSAGE (OUTPATIENT)
Dept: PULMONOLOGY | Facility: CLINIC | Age: 55
End: 2025-05-19

## 2025-05-19 ENCOUNTER — OFFICE VISIT (OUTPATIENT)
Dept: PULMONOLOGY | Facility: CLINIC | Age: 55
End: 2025-05-19
Payer: COMMERCIAL

## 2025-05-19 ENCOUNTER — PATIENT MESSAGE (OUTPATIENT)
Dept: INTERNAL MEDICINE | Facility: CLINIC | Age: 55
End: 2025-05-19

## 2025-05-19 ENCOUNTER — OFFICE VISIT (OUTPATIENT)
Dept: INTERNAL MEDICINE | Facility: CLINIC | Age: 55
End: 2025-05-19
Payer: COMMERCIAL

## 2025-05-19 VITALS
HEIGHT: 74 IN | TEMPERATURE: 98 F | SYSTOLIC BLOOD PRESSURE: 138 MMHG | WEIGHT: 315 LBS | DIASTOLIC BLOOD PRESSURE: 88 MMHG | OXYGEN SATURATION: 95 % | HEART RATE: 100 BPM | BODY MASS INDEX: 40.43 KG/M2

## 2025-05-19 VITALS
HEART RATE: 92 BPM | OXYGEN SATURATION: 98 % | WEIGHT: 315 LBS | RESPIRATION RATE: 20 BRPM | BODY MASS INDEX: 39.17 KG/M2 | HEIGHT: 75 IN | DIASTOLIC BLOOD PRESSURE: 88 MMHG | SYSTOLIC BLOOD PRESSURE: 140 MMHG

## 2025-05-19 DIAGNOSIS — E66.01 MORBID OBESITY: ICD-10-CM

## 2025-05-19 DIAGNOSIS — E11.59 HYPERTENSION ASSOCIATED WITH DIABETES: ICD-10-CM

## 2025-05-19 DIAGNOSIS — Z79.899 LONG TERM USE OF DRUG: ICD-10-CM

## 2025-05-19 DIAGNOSIS — G47.33 OSA (OBSTRUCTIVE SLEEP APNEA): ICD-10-CM

## 2025-05-19 DIAGNOSIS — I15.2 HYPERTENSION ASSOCIATED WITH DIABETES: ICD-10-CM

## 2025-05-19 DIAGNOSIS — Z12.5 SCREENING FOR PROSTATE CANCER: ICD-10-CM

## 2025-05-19 DIAGNOSIS — E78.5 HYPERLIPIDEMIA ASSOCIATED WITH TYPE 2 DIABETES MELLITUS: ICD-10-CM

## 2025-05-19 DIAGNOSIS — G47.33 OSA ON CPAP: Primary | ICD-10-CM

## 2025-05-19 DIAGNOSIS — E11.9 TYPE 2 DIABETES MELLITUS WITHOUT COMPLICATION, WITHOUT LONG-TERM CURRENT USE OF INSULIN: Primary | ICD-10-CM

## 2025-05-19 DIAGNOSIS — F41.9 ANXIETY: ICD-10-CM

## 2025-05-19 DIAGNOSIS — E11.69 HYPERLIPIDEMIA ASSOCIATED WITH TYPE 2 DIABETES MELLITUS: ICD-10-CM

## 2025-05-19 PROCEDURE — G2211 COMPLEX E/M VISIT ADD ON: HCPCS | Mod: S$GLB,,, | Performed by: PHYSICIAN ASSISTANT

## 2025-05-19 PROCEDURE — 3077F SYST BP >= 140 MM HG: CPT | Mod: CPTII,S$GLB,, | Performed by: HOSPITALIST

## 2025-05-19 PROCEDURE — 99214 OFFICE O/P EST MOD 30 MIN: CPT | Mod: S$GLB,,, | Performed by: PHYSICIAN ASSISTANT

## 2025-05-19 PROCEDURE — 1160F RVW MEDS BY RX/DR IN RCRD: CPT | Mod: CPTII,S$GLB,, | Performed by: HOSPITALIST

## 2025-05-19 PROCEDURE — 99999 PR PBB SHADOW E&M-EST. PATIENT-LVL V: CPT | Mod: PBBFAC,,, | Performed by: HOSPITALIST

## 2025-05-19 PROCEDURE — 3044F HG A1C LEVEL LT 7.0%: CPT | Mod: CPTII,S$GLB,, | Performed by: PHYSICIAN ASSISTANT

## 2025-05-19 PROCEDURE — 99999 PR PBB SHADOW E&M-EST. PATIENT-LVL IV: CPT | Mod: PBBFAC,,, | Performed by: PHYSICIAN ASSISTANT

## 2025-05-19 PROCEDURE — 3008F BODY MASS INDEX DOCD: CPT | Mod: CPTII,S$GLB,, | Performed by: PHYSICIAN ASSISTANT

## 2025-05-19 PROCEDURE — 4010F ACE/ARB THERAPY RXD/TAKEN: CPT | Mod: CPTII,S$GLB,, | Performed by: PHYSICIAN ASSISTANT

## 2025-05-19 PROCEDURE — 4010F ACE/ARB THERAPY RXD/TAKEN: CPT | Mod: CPTII,S$GLB,, | Performed by: HOSPITALIST

## 2025-05-19 PROCEDURE — 1159F MED LIST DOCD IN RCRD: CPT | Mod: CPTII,S$GLB,, | Performed by: HOSPITALIST

## 2025-05-19 PROCEDURE — 99213 OFFICE O/P EST LOW 20 MIN: CPT | Mod: S$GLB,,, | Performed by: HOSPITALIST

## 2025-05-19 PROCEDURE — 3044F HG A1C LEVEL LT 7.0%: CPT | Mod: CPTII,S$GLB,, | Performed by: HOSPITALIST

## 2025-05-19 PROCEDURE — 3075F SYST BP GE 130 - 139MM HG: CPT | Mod: CPTII,S$GLB,, | Performed by: PHYSICIAN ASSISTANT

## 2025-05-19 PROCEDURE — 3008F BODY MASS INDEX DOCD: CPT | Mod: CPTII,S$GLB,, | Performed by: HOSPITALIST

## 2025-05-19 PROCEDURE — 3079F DIAST BP 80-89 MM HG: CPT | Mod: CPTII,S$GLB,, | Performed by: HOSPITALIST

## 2025-05-19 PROCEDURE — 3079F DIAST BP 80-89 MM HG: CPT | Mod: CPTII,S$GLB,, | Performed by: PHYSICIAN ASSISTANT

## 2025-05-19 RX ORDER — TIRZEPATIDE 7.5 MG/.5ML
7.5 INJECTION, SOLUTION SUBCUTANEOUS
Qty: 4 PEN | Refills: 1 | Status: SHIPPED | OUTPATIENT
Start: 2025-05-19

## 2025-05-19 NOTE — PROGRESS NOTES
"  Subjective:      Patient ID: Yg Oh is a 55 y.o. male.    Chief Complaint: Follow-up    HPI  History of Present Illness    CHIEF COMPLAINT:  Mr. Oh presents today for a routine follow-up.    ANXIETY:  He reports ongoing stress despite medication management. He takes Celexa daily and clonazepam as needed, typically in the evenings to help with work-related stress decompression. He occasionally takes clonazepam before work during particularly stressful days or when numerous meetings are scheduled. During periods of stress, he experiences physical symptoms including a "rush feeling" and possibly elevated blood pressure, though denies increased heart rate. His specific anxiety triggers include driving in heavy traffic and crowded places, reporting feeling overwhelmed and experiencing "sensory overload" in environments like [employer]. However, he denies anxiety in all crowded settings.    DIABETES:  He takes Ozempic for diabetes management. His diet includes morning protein shakes, variable lunch schedule, and regular dinner. He has eliminated biscuits from morning meals. Blood glucose monitoring has decreased from daily to weekly checks, with morning readings ranging from 100-120 and reported afternoon post-work spikes. Most recent HbA1c was 6.3, showing slight elevation from previous value.  Would like to lose additional weight. His weight has started to increase over the past few months. Pt states that ozempic is not as effective as it was initially at controlling his appetite.     SLEEP:  He reports consistent CPAP machine usage for years and now finds it difficult to sleep without it. CPAP data requires updating since last appointment.    BP has been stable but elevated occasional at doctor appointments.     BP Readings from Last 3 Encounters:   05/19/25 (!) 140/88   05/19/25 138/88   11/18/24 132/86          Medications were reviewed        Wt Readings from Last 3 Encounters:   05/19/25 1114 (!) 179.3 " "kg (395 lb 4.6 oz)   05/19/25 0826 (!) 179 kg (394 lb 10 oz)   11/25/24 1208 (!) 175 kg (385 lb 12.9 oz)      Problem List[1]    Current Medications[2]    Review of Systems  ROS:  General: -fever, -chills, -fatigue, -weight gain, -weight loss  Eyes: +vision changes, -redness, -discharge  ENT: -ear pain, -nasal congestion, -sore throat  Cardiovascular: -chest pain, -palpitations, -lower extremity edema  Respiratory: -cough, -shortness of breath, +exertional dyspnea  Gastrointestinal: -abdominal pain, -nausea, -vomiting, -diarrhea, -constipation, -blood in stool  Genitourinary: -dysuria, -hematuria, -frequency  Musculoskeletal: -joint pain, -muscle pain  Skin: -rash, -lesion  Neurological: -headache, -dizziness, -numbness, -tingling  Psychiatric: +anxiety, -depression, -sleep difficulty, +panic attacks   Objective:   /88 (BP Location: Right arm, Patient Position: Sitting)   Pulse 100   Temp 97.9 °F (36.6 °C) (Tympanic)   Ht 6' 2" (1.88 m)   Wt (!) 179 kg (394 lb 10 oz)   SpO2 95%   BMI 50.67 kg/m²     Physical Exam  Vitals reviewed.   Constitutional:       General: He is not in acute distress.     Appearance: Normal appearance. He is well-developed. He is not ill-appearing, toxic-appearing or diaphoretic.   HENT:      Head: Normocephalic and atraumatic.      Right Ear: External ear normal.      Left Ear: External ear normal.      Nose: Nose normal.   Eyes:      Conjunctiva/sclera: Conjunctivae normal.      Pupils: Pupils are equal, round, and reactive to light.   Cardiovascular:      Rate and Rhythm: Normal rate and regular rhythm.      Heart sounds: Normal heart sounds. No murmur heard.     No friction rub. No gallop.   Pulmonary:      Effort: Pulmonary effort is normal. No respiratory distress.      Breath sounds: Normal breath sounds. No wheezing or rales.   Chest:      Chest wall: No tenderness.   Abdominal:      General: There is no distension.      Palpations: Abdomen is soft.      Tenderness: There " is no abdominal tenderness.   Musculoskeletal:         General: Normal range of motion.      Cervical back: Normal range of motion and neck supple.   Lymphadenopathy:      Cervical: No cervical adenopathy.   Skin:     General: Skin is warm and dry.      Capillary Refill: Capillary refill takes less than 2 seconds.      Findings: No rash.   Neurological:      Mental Status: He is alert and oriented to person, place, and time.      Motor: No weakness.      Coordination: Coordination normal.      Gait: Gait normal.   Psychiatric:         Mood and Affect: Mood normal.         Behavior: Behavior normal.         Thought Content: Thought content normal.         Judgment: Judgment normal.       Lab Visit on 05/12/2025   Component Date Value Ref Range Status    Hemoglobin A1c 05/12/2025 6.3 (H)  4.0 - 5.6 % Final    ADA Screening Guidelines:  5.7-6.4%  Consistent with prediabetes  >=6.5%  Consistent with diabetes    High levels of fetal hemoglobin interfere with the HbA1C  assay. Heterozygous hemoglobin variants (HbS, HgC, etc)do  not significantly interfere with this assay.   However, presence of multiple variants may affect accuracy.    Estimated Average Glucose 05/12/2025 134 (H)  68 - 131 mg/dL Final   Patient Outreach on 03/31/2025   Component Date Value Ref Range Status    Cologuard Result 04/09/2025 Negative  Negative Final    Comment: The Cologuard (TM) test was performed on this specimen.    NEGATIVE TEST RESULT. A negative Cologuard result indicates a low likelihood that a colorectal cancer (CRC) or advanced adenoma (adenomatous polyps with more advanced pre-malignant features) is present. The chance that a person with a negative Cologuard test has a colorectal cancer is less than 1 in 1500 (negative predictive value >99.9%) or has an advanced adenoma is less than 5.3% (negative predictive value 94.7%). These data are based on a prospective cross-sectional study of 10,000 individuals at average risk for colorectal  cancer who were screened with both Cologuard and colonoscopy. (Barney Velez al, N Engl J Med 2014;370(14):9360-6865) The normal value (reference range) for this assay is negative.    COLOGUARD RE-SCREENING RECOMMENDATION: Periodic colorectal cancer screening is an important part of preventive healthcare for asymptomatic individuals at average risk for colorectal cancer. Following a negative Cologuard                            result, the American Cancer Society and U.S. Multi-Society Task Force screening guidelines recommend a Cologuard re-screening interval of 3 years.   References: American Cancer Society Guideline for Colorectal Cancer Screening: https://www.cancer.org/cancer/colon-rectal-cancer/dudsskiel-tovnxtcaw-qbvyxik/acs-recommendations.html.; Siva DK, Tomasa ALEXIS, Fritz LEOK, Colorectal Cancer Screening: Recommendations for Physicians and Patients from the U.S. Multi-Society Task Force on Colorectal Cancer Screening , Am J Gastroenterology 2017; 112:3261-9265.    TEST DESCRIPTION: Composite algorithmic analysis of stool DNA-biomarkers with hemoglobin immunoassay.   Quantitative values of individual biomarkers are not reportable and are not associated with individual biomarker result reference ranges. Cologuard is intended for colorectal cancer screening of adults of either sex, 45 years or older, who are at average-risk for colorectal cancer (CRC). Cologuard has been approved for use by the U.S.                            FDA. The performance of Cologuard was established in a cross sectional study of average-risk adults aged 50-84. Cologuard performance in patients ages 45 to 49 years was estimated by sub-group analysis of near-age groups. Colonoscopies performed for a positive result may find as the most clinically significant lesion: colorectal cancer [4.0%], advanced adenoma (including sessile serrated polyps greater than or equal to 1cm diameter) [20%] or non- advanced adenoma [31%]; or no colorectal  neoplasia [45%]. These estimates are derived from a prospective cross-sectional screening study of 10,000 individuals at average risk for colorectal cancer who were screened with both Cologuard and colonoscopy. (Barney Velez al, N Engl J Med 2014;370(14):8207-5453.) Cologuard may produce a false negative or false positive result (no colorectal cancer or precancerous polyp present at colonoscopy follow up). A negative Cologuard test result does not guarantee the absence of CRC or advanced adenoma                            (pre-cancer). The current Cologuard screening interval is every 3 years. (American Cancer Society and U.S. Multi-Society Task Force). Cologuard performance data in a 10,000 patient pivotal study using colonoscopy as the reference method can be accessed at the following location: www.[employer]/results. Additional description of the Cologuard test process, warnings and precautions can be found at www.[employer].eeden.         Assessment:     1. Type 2 diabetes mellitus without complication, without long-term current use of insulin    2. Hypertension associated with diabetes    3. Hyperlipidemia associated with type 2 diabetes mellitus    4. Long term use of drug    5. Anxiety    6. BMI 50.0-59.9, adult    7. MEGAN (obstructive sleep apnea)    8. Morbid obesity    9. Screening for prostate cancer      Plan:   Switched from Ozempic to Mounjaro (tirzepatide) 7.5 mg to address weight loss plateau and potentially improve glycemic control.  Continued Celexa and clonazepam as needed for stress management.  Noted recent A1C increase to 6.3, still within acceptable range.  Evaluated current diet and exercise habits in relation to weight management and glucose control.    Type 2 diabetes mellitus without complication, without long-term current use of insulin  -     tirzepatide (MOUNJARO) 7.5 mg/0.5 mL PnIj; Inject 7.5 mg into the skin every 7 days.  Dispense: 4 Pen; Refill: 1  -     Hemoglobin A1C; Future;  Expected date: 08/19/2025  -     CBC Auto Differential; Future; Expected date: 11/19/2025  -     Microalbumin/Creatinine Ratio, Urine; Future; Expected date: 11/19/2025  -     Hemoglobin A1C; Future; Expected date: 11/19/2025  - Advised scheduling an eye exam for comprehensive evaluation of vision changes either on 3rd floor or through the patient portal.  - Monitored glucose levels, typically measured weekly with morning readings around 100-120 mg/dL.  - Discussed switching from Ozempic to Mounjaro for improved glycemic control and to help with weight.  - Educated patient about high sugar content in certain fruits like pineapple and vegetables like carrots.    Screening for prostate cancer  -     PSA, Screening; Future; Expected date: 11/19/2025    Hypertension associated with diabetes  -     CBC Auto Differential; Future; Expected date: 11/19/2025  -     Comprehensive Metabolic Panel; Future; Expected date: 11/19/2025  --elevated today in clinic. Will have him cont to monitor at home and re-evaluate at follow up  -bring in log of bp to follow up for review.     Hyperlipidemia associated with type 2 diabetes mellitus  -     CBC Auto Differential; Future; Expected date: 11/19/2025  -     Lipid Panel; Future; Expected date: 11/19/2025  -continue pravastatin    Long term use of drug  -     Vitamin B12; Future; Expected date: 11/19/2025    MEGAN (obstructive sleep apnea)  -     Home Sleep Study; Future  -     Ambulatory referral/consult to Pulmonology; Future; Expected date: 05/26/2025    Anxiety  - Monitored patient's stress and anxiety symptoms, particularly during meetings and after work.  - Mr. Oh uses clonazepam as needed, primarily in evenings and occasionally before work when experiencing overwhelming feelings and sensory overload in crowded environments.  - Discussed stress management strategies and appropriate medication utilization.  - Continued prescription of Celexa for daily use and clonazepam as needed  for anxiety management.    MEGAN on CPAP  - Mr. Oh uses CPAP regularly with no reported issues but requires an update to the device.  - Ordered home sleep study for reassessment and referred patient to pulmonology for follow-up after sleep study to update CPAP settings.  -due for another sleep study per pulm last office note. Will order for him and have him follow up with pulm.    Morbid obesity  BMI 50.0-59.9, adult  - Mr. Oh has reached a weight loss plateau on Ozempic despite improved lifestyle modifications, including walking at work, bike riding, elimination of biscuits, smaller meals, and protein shakes in the morning.  - A1C has increased to 6.3 but remains within acceptable parameters.  - Switching from Ozempic to Mounjaro (tirzepatide) 7.5 mg (1 pen per use, to be refrigerated) for potentially improved outcomes.  - Educated patient on importance of protein intake while on GLP-1 receptor agonists and benefits of eating slowly to improve satiety signals.  - Follow-up in 1 month to assess Mounjaro efficacy and consider dose adjustment to 10 mg if necessary.  - Ordered A1C test in 3 months and comprehensive laboratory evaluation in 6 months.      - Recommend dietary modifications including: eating smaller, more frequent meals throughout the day with largest meal earlier; eating slowly while pausing between portions to assess fullness; and incorporating psyllium husk into morning protein shake.         Follow up in about 4 weeks (around 6/16/2025), or if symptoms worsen or fail to improve.           [1]   Patient Active Problem List  Diagnosis    Hypertension    Type 2 diabetes mellitus without complication, without long-term current use of insulin    Morbid obesity    Anxiety    Personal history of colonic polyps    MEGAN on CPAP    BMI 50.0-59.9, adult   [2]   Current Outpatient Medications:     azelastine (ASTELIN) 137 mcg (0.1 %) nasal spray, SPRAY 1 SPRAY BY NASAL ROUTE 2 TIMES DAILY., Disp: 90 mL, Rfl:  3    blood sugar diagnostic Strp, Use to check blood sugar twice daily, Disp: 100 each, Rfl: 6    citalopram (CELEXA) 40 MG tablet, Take 1 tablet (40 mg total) by mouth once daily., Disp: 90 tablet, Rfl: 3    clonazePAM (KLONOPIN) 1 MG tablet, Take 1 tablet (1 mg total) by mouth 2 (two) times daily as needed for Anxiety., Disp: 60 tablet, Rfl: 5    fluticasone propionate (FLONASE) 50 mcg/actuation nasal spray, USE 1 SPRAY (50 MCG TOTAL) IN EACH NOSTRIL ONCE DAILY, Disp: 16 mL, Rfl: 1    lancets (LANCETS,THIN) Misc, Use to check blood sugar twice daily, Disp: 100 each, Rfl: 6    lancets Misc, Apply topically 2 (two) times daily., Disp: , Rfl:     losartan (COZAAR) 50 MG tablet, Take 1 tablet (50 mg total) by mouth once daily., Disp: 90 tablet, Rfl: 1    metFORMIN (GLUCOPHAGE) 500 MG tablet, Take 2 tablets (1,000 mg total) by mouth 2 (two) times daily with meals., Disp: 360 tablet, Rfl: 1    metoprolol succinate (TOPROL-XL) 100 MG 24 hr tablet, Take 1 tablet (100 mg total) by mouth once daily., Disp: 90 tablet, Rfl: 1    omeprazole (PRILOSEC) 20 MG capsule, Take 1 capsule (20 mg total) by mouth once daily., Disp: 90 capsule, Rfl: 1    pravastatin (PRAVACHOL) 40 MG tablet, Take 1 tablet (40 mg total) by mouth once daily., Disp: 90 tablet, Rfl: 3    blood-glucose meter kit, Use as instructed to check glucose twice daily, Disp: 1 each, Rfl: 0    tirzepatide (MOUNJARO) 7.5 mg/0.5 mL PnIj, Inject 7.5 mg into the skin every 7 days., Disp: 4 Pen, Rfl: 1

## 2025-05-19 NOTE — PROGRESS NOTES
"  Subjective:      Patient ID: Yg Oh is a 55 y.o. male.    Chief Complaint: Sleep Apnea    Interval Hx 5/19/25:    55 year old male with history of HTN, DM2, morbid obesity, sleep apnea who is referred to Pulm clinic by SAUD Dickinson for evaluation of sleep apnea. He was previously seen in clinic with last visit 11/2023 with Dr. Bneito- it was patient's initial visit, he reported being on PAP therapy for at least 20 years, machine at that time was >9 years old, PSG was not completed.     Gets supplies from [employer]. Doesn't use water anymore- chamber cracked. Diligent on cleaning. Ok with [facility]. [place] today is 7.    BP Readings from Last 3 Encounters:   05/19/25 (!) 140/88   05/19/25 138/88   11/18/24 132/86     Review of Systems   Respiratory:  Positive for somnolence. Negative for snoring.        Objective:     Physical Exam   Constitutional: He is oriented to person, place, and time. He appears well-developed and well-nourished. He is obese.   Cardiovascular: Normal rate and regular rhythm.   Pulmonary/Chest: Normal expansion, effort normal and breath sounds normal.   Neurological: He is alert and oriented to person, place, and time.   Skin: Skin is warm and dry.     Personal Diagnostic Review  As Above      5/19/2025     8:26 AM 11/25/2024    12:08 PM 11/18/2024     8:12 AM 2/26/2024     4:58 PM 11/17/2023     8:24 AM 11/13/2023     8:03 AM 10/12/2023     9:02 AM   Pulmonary Function Tests   SpO2 95 %  97 % 97 % 98 % 97 % 96 %   Height 6' 2" (1.88 m) 6' 2" (1.88 m) 6' 2" (1.88 m) 6' 2" (1.88 m) 6' 2" (1.88 m) 6' 2" (1.88 m) 6' 2" (1.88 m)   Weight 179 kg (394 lb 10 oz) 175 kg (385 lb 12.9 oz) 175.9 kg (387 lb 12.6 oz) 177 kg (390 lb 3.4 oz) 176.9 kg (389 lb 15.9 oz) 177.1 kg (390 lb 7 oz) 180.6 kg (398 lb 2.4 oz)   BMI (Calculated) 50.6 49.5 49.8 50.1 50.1 50.1 51.1        Assessment:     No diagnosis found.     Encounter Medications[1]  No orders of the defined types were placed in " this encounter.    Plan:     Problem List Items Addressed This Visit       Morbid obesity    - would benefit from weight loss, prescribed Mounjaro today by primary care         MEGAN on CPAP - Primary    - benefits from PAP use, if doesn't use then has great difficulty sleeping through the night, doesn't feel well rested  - discussed benefit of assessment with PSG over HST- pt agreeable to do PSG, to message if it doesn't work out  - hx htn, obesity  - ok with new machine being ordered through [facility]  - epworth today is 7            Other Visit Diagnoses         MEGAN (obstructive sleep apnea)        Relevant Orders    Polysomnogram (CPAP will be added if patient meets diagnostic criteria.)          Follow up for PSG review. Thank you SAUD Dickinson for referring Mr. Oh for evaluation.         [1]   Outpatient Encounter Medications as of 5/19/2025   Medication Sig Dispense Refill    azelastine (ASTELIN) 137 mcg (0.1 %) nasal spray SPRAY 1 SPRAY BY NASAL ROUTE 2 TIMES DAILY. 90 mL 3    blood sugar diagnostic Strp Use to check blood sugar twice daily 100 each 6    blood-glucose meter kit Use as instructed to check glucose twice daily 1 each 0    citalopram (CELEXA) 40 MG tablet Take 1 tablet (40 mg total) by mouth once daily. 90 tablet 3    clonazePAM (KLONOPIN) 1 MG tablet Take 1 tablet (1 mg total) by mouth 2 (two) times daily as needed for Anxiety. 60 tablet 5    fluticasone propionate (FLONASE) 50 mcg/actuation nasal spray USE 1 SPRAY (50 MCG TOTAL) IN EACH NOSTRIL ONCE DAILY 16 mL 1    lancets (LANCETS,THIN) Misc Use to check blood sugar twice daily 100 each 6    lancets Misc Apply topically 2 (two) times daily.      losartan (COZAAR) 50 MG tablet Take 1 tablet (50 mg total) by mouth once daily. 90 tablet 1    metFORMIN (GLUCOPHAGE) 500 MG tablet Take 2 tablets (1,000 mg total) by mouth 2 (two) times daily with meals. 360 tablet 1    metoprolol succinate (TOPROL-XL) 100 MG 24 hr tablet Take 1 tablet (100 mg  total) by mouth once daily. 90 tablet 1    omeprazole (PRILOSEC) 20 MG capsule Take 1 capsule (20 mg total) by mouth once daily. 90 capsule 1    pravastatin (PRAVACHOL) 40 MG tablet Take 1 tablet (40 mg total) by mouth once daily. 90 tablet 3    tirzepatide (MOUNJARO) 7.5 mg/0.5 mL PnIj Inject 7.5 mg into the skin every 7 days. 4 Pen 1    [DISCONTINUED] semaglutide (OZEMPIC) 2 mg/dose (8 mg/3 mL) PnIj Inject 2 mg into the skin every 7 days. 3 mL 11     No facility-administered encounter medications on file as of 5/19/2025.

## 2025-05-19 NOTE — ASSESSMENT & PLAN NOTE
- benefits from PAP use, if doesn't use then has great difficulty sleeping through the night, doesn't feel well rested  - discussed benefit of assessment with PSG over HST- pt agreeable to do PSG, to message if it doesn't work out  - hx htn, obesity  - ok with new machine being ordered through [facility]  - epworth today is 7

## 2025-06-03 ENCOUNTER — PATIENT MESSAGE (OUTPATIENT)
Dept: PULMONOLOGY | Facility: CLINIC | Age: 55
End: 2025-06-03
Payer: COMMERCIAL

## 2025-06-06 ENCOUNTER — ON-DEMAND VIRTUAL (OUTPATIENT)
Dept: URGENT CARE | Facility: CLINIC | Age: 55
End: 2025-06-06
Payer: COMMERCIAL

## 2025-06-06 DIAGNOSIS — F41.9 ANXIETY: ICD-10-CM

## 2025-06-06 DIAGNOSIS — J01.10 ACUTE NON-RECURRENT FRONTAL SINUSITIS: Primary | ICD-10-CM

## 2025-06-06 RX ORDER — AMOXICILLIN AND CLAVULANATE POTASSIUM 875; 125 MG/1; MG/1
1 TABLET, FILM COATED ORAL EVERY 12 HOURS
Qty: 10 TABLET | Refills: 0 | Status: SHIPPED | OUTPATIENT
Start: 2025-06-06

## 2025-06-06 RX ORDER — PREDNISONE 10 MG/1
10 TABLET ORAL DAILY
Qty: 3 TABLET | Refills: 0 | Status: SHIPPED | OUTPATIENT
Start: 2025-06-06 | End: 2025-06-09

## 2025-06-10 RX ORDER — CLONAZEPAM 1 MG/1
1 TABLET ORAL 2 TIMES DAILY PRN
Qty: 60 TABLET | Refills: 5 | Status: SHIPPED | OUTPATIENT
Start: 2025-06-10

## 2025-06-12 ENCOUNTER — PATIENT MESSAGE (OUTPATIENT)
Dept: INTERNAL MEDICINE | Facility: CLINIC | Age: 55
End: 2025-06-12
Payer: COMMERCIAL

## 2025-06-12 DIAGNOSIS — E11.65 TYPE 2 DIABETES MELLITUS WITH HYPERGLYCEMIA, WITHOUT LONG-TERM CURRENT USE OF INSULIN: ICD-10-CM

## 2025-06-12 DIAGNOSIS — I10 PRIMARY HYPERTENSION: ICD-10-CM

## 2025-06-12 DIAGNOSIS — F41.9 ANXIETY: ICD-10-CM

## 2025-06-12 DIAGNOSIS — K21.9 GASTROESOPHAGEAL REFLUX DISEASE, UNSPECIFIED WHETHER ESOPHAGITIS PRESENT: ICD-10-CM

## 2025-06-12 RX ORDER — METOPROLOL SUCCINATE 100 MG/1
100 TABLET, EXTENDED RELEASE ORAL DAILY
Qty: 90 TABLET | Refills: 1 | Status: SHIPPED | OUTPATIENT
Start: 2025-06-12

## 2025-06-12 RX ORDER — OMEPRAZOLE 20 MG/1
20 CAPSULE, DELAYED RELEASE ORAL DAILY
Qty: 90 CAPSULE | Refills: 1 | Status: SHIPPED | OUTPATIENT
Start: 2025-06-12

## 2025-06-12 RX ORDER — CITALOPRAM 40 MG/1
40 TABLET ORAL DAILY
Qty: 90 TABLET | Refills: 3 | Status: SHIPPED | OUTPATIENT
Start: 2025-06-12

## 2025-06-12 RX ORDER — LOSARTAN POTASSIUM 50 MG/1
50 TABLET ORAL DAILY
Qty: 90 TABLET | Refills: 1 | Status: SHIPPED | OUTPATIENT
Start: 2025-06-12 | End: 2025-12-09

## 2025-06-12 RX ORDER — METFORMIN HYDROCHLORIDE 500 MG/1
1000 TABLET ORAL 2 TIMES DAILY WITH MEALS
Qty: 360 TABLET | Refills: 1 | Status: SHIPPED | OUTPATIENT
Start: 2025-06-12

## 2025-06-12 RX ORDER — PRAVASTATIN SODIUM 40 MG/1
40 TABLET ORAL DAILY
Qty: 90 TABLET | Refills: 1 | Status: SHIPPED | OUTPATIENT
Start: 2025-06-12

## 2025-06-12 NOTE — TELEPHONE ENCOUNTER
Refill Decision Note   Yg Oh  is requesting a refill authorization.  Brief Assessment and Rationale for Refill:  Approve     Medication Therapy Plan:         Comments:     Note composed:4:01 PM 06/12/2025             Appointments     Last Visit   11/18/2024 Brennan Lainez MD   Next Visit   11/24/2025 Brennan Lainez MD

## 2025-06-12 NOTE — TELEPHONE ENCOUNTER
No care due was identified.  [facility] Embedded Care Due Messages. Reference number: [id number].   6/12/2025 1:27:52 PM CDT

## 2025-06-20 ENCOUNTER — OFFICE VISIT (OUTPATIENT)
Dept: INTERNAL MEDICINE | Facility: CLINIC | Age: 55
End: 2025-06-20
Payer: COMMERCIAL

## 2025-06-20 VITALS
HEIGHT: 75 IN | TEMPERATURE: 99 F | OXYGEN SATURATION: 97 % | HEART RATE: 99 BPM | SYSTOLIC BLOOD PRESSURE: 120 MMHG | DIASTOLIC BLOOD PRESSURE: 84 MMHG | WEIGHT: 315 LBS | BODY MASS INDEX: 39.17 KG/M2

## 2025-06-20 DIAGNOSIS — K21.9 GASTROESOPHAGEAL REFLUX DISEASE, UNSPECIFIED WHETHER ESOPHAGITIS PRESENT: ICD-10-CM

## 2025-06-20 DIAGNOSIS — E66.813 CLASS 3 SEVERE OBESITY DUE TO EXCESS CALORIES WITH SERIOUS COMORBIDITY AND BODY MASS INDEX (BMI) OF 45.0 TO 49.9 IN ADULT: ICD-10-CM

## 2025-06-20 DIAGNOSIS — E11.65 TYPE 2 DIABETES MELLITUS WITH HYPERGLYCEMIA, WITHOUT LONG-TERM CURRENT USE OF INSULIN: ICD-10-CM

## 2025-06-20 DIAGNOSIS — G47.33 OSA ON CPAP: ICD-10-CM

## 2025-06-20 DIAGNOSIS — E66.01 MORBID OBESITY: ICD-10-CM

## 2025-06-20 DIAGNOSIS — F41.9 ANXIETY: ICD-10-CM

## 2025-06-20 DIAGNOSIS — E66.01 CLASS 3 SEVERE OBESITY DUE TO EXCESS CALORIES WITH SERIOUS COMORBIDITY AND BODY MASS INDEX (BMI) OF 45.0 TO 49.9 IN ADULT: ICD-10-CM

## 2025-06-20 DIAGNOSIS — E11.9 TYPE 2 DIABETES MELLITUS WITHOUT COMPLICATION, WITHOUT LONG-TERM CURRENT USE OF INSULIN: ICD-10-CM

## 2025-06-20 DIAGNOSIS — E11.59 HYPERTENSION ASSOCIATED WITH DIABETES: Primary | ICD-10-CM

## 2025-06-20 DIAGNOSIS — I15.2 HYPERTENSION ASSOCIATED WITH DIABETES: Primary | ICD-10-CM

## 2025-06-20 PROCEDURE — 99999 PR PBB SHADOW E&M-EST. PATIENT-LVL IV: CPT | Mod: PBBFAC,,, | Performed by: PHYSICIAN ASSISTANT

## 2025-06-20 RX ORDER — TIRZEPATIDE 12.5 MG/.5ML
12.5 INJECTION, SOLUTION SUBCUTANEOUS
Qty: 6 ML | Refills: 3 | Status: SHIPPED | OUTPATIENT
Start: 2025-06-20 | End: 2026-05-22

## 2025-06-20 RX ORDER — OMEPRAZOLE 20 MG/1
20 CAPSULE, DELAYED RELEASE ORAL 2 TIMES DAILY
Qty: 180 CAPSULE | Refills: 3 | Status: SHIPPED | OUTPATIENT
Start: 2025-06-20 | End: 2026-06-15

## 2025-06-20 RX ORDER — LOSARTAN POTASSIUM 50 MG/1
50 TABLET ORAL DAILY
Qty: 90 TABLET | Refills: 3 | Status: SHIPPED | OUTPATIENT
Start: 2025-06-20 | End: 2026-06-15

## 2025-06-20 RX ORDER — METOPROLOL SUCCINATE 100 MG/1
100 TABLET, EXTENDED RELEASE ORAL DAILY
Qty: 90 TABLET | Refills: 3 | Status: SHIPPED | OUTPATIENT
Start: 2025-06-20

## 2025-06-20 RX ORDER — CITALOPRAM 40 MG/1
40 TABLET ORAL DAILY
Qty: 90 TABLET | Refills: 3 | Status: SHIPPED | OUTPATIENT
Start: 2025-06-20

## 2025-06-20 RX ORDER — METFORMIN HYDROCHLORIDE 500 MG/1
1000 TABLET ORAL 2 TIMES DAILY WITH MEALS
Qty: 360 TABLET | Refills: 4 | Status: SHIPPED | OUTPATIENT
Start: 2025-06-20

## 2025-06-20 NOTE — PROGRESS NOTES
"  Subjective:      Patient ID: Yg Oh is a 55 y.o. male.    Chief Complaint: Follow-up    HPI  History of Present Illness    CHIEF COMPLAINT:  Mr. Oh presents today for follow up after recent job loss    HYPERTENSION:  He performs consistent home blood pressure monitoring with 18 readings taken nightly at approximately 7:30 PM. Readings have been relatively stable despite experiencing some recent stress and remain within acceptable range of under 140/90.    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 He experiences a constant backache managed with OTC pain medications including ibuprofen 800 mg and Aleve intermittently, attempting to avoid daily use. He takes pain medication proactively to manage discomfort, particularly after physical activity.    SLEEP DISORDERS:  He uses clonazepam 1 pill nightly to help with sleep, assisting in decompression and falling asleep. He currently uses a CPAP machine for sleep management.    RECENT SINUSITIS:  He recently had sinusitis treated via virtual visit with significant sinus pressure and headache. He was prescribed prednisone 10 mg and amoxicillin, which effectively resolved symptoms. He notes previous negative experience with higher dose prednisone 25 mg, which caused significant side effects including insomnia for four days and palpitations. Current treatment with lower dose prednisone was well-tolerated.    CURRENT MEDICATIONS:  He is currently on Mounjaro with recent dose increase to 7.5 mg. He experiences a unique side effect of altered lettuce taste perception, describing it as tasting "funky". He continues metformin, metoprolol, pravastatin, and omeprazole 20 mg daily. He reports previous adverse reaction to metoprolol 25 mg, including sleep disruption, palpitations, and feeling "completely numb".    Medications were reviewed        Wt Readings from Last 3 Encounters: " "  06/20/25 0803 (!) 178.2 kg (392 lb 13.8 oz)   05/19/25 1114 (!) 179.3 kg (395 lb 4.6 oz)   05/19/25 0826 (!) 179 kg (394 lb 10 oz)      Problem List[1]    Current Medications[2]    Review of Systems   Constitutional:  Negative for activity change, appetite change, chills, diaphoresis, fatigue, fever and unexpected weight change.   HENT: Negative.  Negative for congestion, hearing loss, postnasal drip, rhinorrhea, sore throat, trouble swallowing and voice change.    Eyes: Negative.  Negative for visual disturbance.   Respiratory: Negative.  Negative for cough, choking, chest tightness and shortness of breath.    Cardiovascular:  Negative for chest pain, palpitations and leg swelling.   Gastrointestinal:  Negative for abdominal distention, abdominal pain, blood in stool, constipation, diarrhea, nausea and vomiting.   Endocrine: Negative for cold intolerance, heat intolerance, polydipsia and polyuria.   Genitourinary: Negative.  Negative for difficulty urinating and frequency.   Musculoskeletal:  Negative for arthralgias, back pain, gait problem, joint swelling and myalgias.   Skin:  Negative for color change, pallor, rash and wound.   Neurological:  Negative for dizziness, tremors, weakness, light-headedness, numbness and headaches.   Hematological:  Negative for adenopathy.   Psychiatric/Behavioral:  Negative for behavioral problems, confusion, self-injury, sleep disturbance and suicidal ideas. The patient is not nervous/anxious.      Objective:   /84 (BP Location: Left arm, Patient Position: Sitting)   Pulse 99   Temp 98.9 °F (37.2 °C) (Tympanic)   Ht 6' 3" (1.905 m)   Wt (!) 178.2 kg (392 lb 13.8 oz)   SpO2 97%   BMI 49.10 kg/m²     Physical Exam  Vitals and nursing note reviewed.   Constitutional:       General: He is not in acute distress.     Appearance: Normal appearance. He is well-developed. He is obese. He is not ill-appearing, toxic-appearing or diaphoretic.   HENT:      Head: Normocephalic " and atraumatic.   Neck:      Vascular: No carotid bruit.   Cardiovascular:      Rate and Rhythm: Normal rate and regular rhythm.      Heart sounds: Normal heart sounds. No murmur heard.     No friction rub. No gallop.   Pulmonary:      Effort: Pulmonary effort is normal. No respiratory distress.      Breath sounds: Normal breath sounds. No wheezing or rales.   Skin:     General: Skin is warm.      Findings: No rash.   Neurological:      Mental Status: He is alert and oriented to person, place, and time.   Psychiatric:         Mood and Affect: Mood normal.         Behavior: Behavior normal.         Thought Content: Thought content normal.         Judgment: Judgment normal.         Assessment:     1. Hypertension associated with diabetes    2. Type 2 diabetes mellitus without complication, without long-term current use of insulin    3. MEGAN on CPAP    4. Class 3 severe obesity due to excess calories with serious comorbidity and body mass index (BMI) of 45.0 to 49.9 in adult    5. Morbid obesity    6. Type 2 diabetes mellitus with hyperglycemia, without long-term current use of insulin    7. Primary hypertension    8. Gastroesophageal reflux disease, unspecified whether esophagitis present    9. Anxiety      Plan:   Hypertension associated with diabetes  -bp stable and controlled on losartan and metoprolol. Home readings reviewed. Systolic 120-130 and diastolic 70-80.   -     metoprolol succinate (TOPROL-XL) 100 MG 24 hr tablet; Take 1 tablet (100 mg total) by mouth once daily.  Dispense: 90 tablet; Refill: 3  -     losartan (COZAAR) 50 MG tablet; Take 1 tablet (50 mg total) by mouth once daily.  Dispense: 90 tablet; Refill: 3    Type 2 diabetes mellitus without complication, without long-term current use of insulin  -     tirzepatide (MOUNJARO) 12.5 mg/0.5 mL PnMicha; Inject 12.5 mg into the skin every 7 days.  Dispense: 6 mL; Refill: 3  -increase mounjaro from 7.5 to 12.5. SE discussed.   - Evaluated patient's diabetes  and found it to be controlled.  - Increased Mounjaro dosage from 7.5 mg to 12.5 mg due to minimal side effects and potential for better glycemic control; dispensed 90-day supply to ensure medication adherence.  - Emphasized the importance of maintaining insurance coverage for diabetes management and advised patient not to go without insurance due to potential complications.    MEGAN on CPAP  - Recommend transitioning to melatonin or doxylamine succinate (Unisom) as safer alternatives for regular sleep support.  -klonopin PRN   - Suggested purchasing CPAP supplies online as the patient's current machine is outdated.    Class 3 severe obesity due to excess calories with serious comorbidity and body mass index (BMI) of 45.0 to 49.9 in adult    Morbid obesity    Type 2 diabetes mellitus with hyperglycemia, without long-term current use of insulin  -     metFORMIN (GLUCOPHAGE) 500 MG tablet; Take 2 tablets (1,000 mg total) by mouth 2 (two) times daily with meals.  Dispense: 360 tablet; Refill: 4      Gastroesophageal reflux disease, unspecified whether esophagitis present  -     omeprazole (PRILOSEC) 20 MG capsule; Take 1 capsule (20 mg total) by mouth 2 (two) times a day.  Dispense: 180 capsule; Refill: 3  -stable and controlled on prilosec. Taking once daily. Advised ok to take BID prn    Anxiety  -     citalopram (CELEXA) 40 MG tablet; Take 1 tablet (40 mg total) by mouth once daily.  Dispense: 90 tablet; Refill: 3  -stable and controled on celexa    LUMBAR DISC DISPLACEMENT AND SPINAL FUSION:  - Monitored patient's constant lumbar pain related to history of L5 disc slippage and surgical fusion.  - Pain is currently managed with NSAIDs and has not worsened recently.  - Recommend pain management strategy to protect renal and gastric function: alternate between ibuprofen (maximum 800 mg for no more than 7 consecutive days), naproxen, and acetaminophen extra strength.  - Discussed concept of preemptive analgesia with  potential benefits and drawbacks.       MEDICATION MANAGEMENT AND INSURANCE:  - Provided information on [employer] as potential cost-saving option for prescription medications, especially during insurance transition; patient to explore [employer] [facility].  - Continued clonazepam with 60-day supply; advised appropriate for situational anxiety and occasional sleep issues; explained habit-forming nature.       This note was generated with the assistance of ambient listening technology. Verbal consent was obtained by the patient and accompanying visitor(s) for the recording of patient appointment to facilitate this note. I attest to having reviewed and edited the generated note for accuracy, though some syntax or spelling errors may persist. Please contact the author of this note for any clarification.      No follow-ups on file.           [1]   Patient Active Problem List  Diagnosis    Hypertension associated with diabetes    Type 2 diabetes mellitus without complication, without long-term current use of insulin    Morbid obesity    Anxiety    Personal history of colonic polyps    MEGAN on CPAP    Class 3 severe obesity due to excess calories with serious comorbidity and body mass index (BMI) of 45.0 to 49.9 in adult   [2]   Current Outpatient Medications:     azelastine (ASTELIN) 137 mcg (0.1 %) nasal spray, SPRAY 1 SPRAY BY NASAL ROUTE 2 TIMES DAILY., Disp: 90 mL, Rfl: 3    blood sugar diagnostic Strp, Use to check blood sugar twice daily, Disp: 100 each, Rfl: 6    blood-glucose meter kit, Use as instructed to check glucose twice daily, Disp: 1 each, Rfl: 0    clonazePAM (KLONOPIN) 1 MG tablet, Take 1 tablet (1 mg total) by mouth 2 (two) times daily as needed for Anxiety., Disp: 60 tablet, Rfl: 5    fluticasone propionate (FLONASE) 50 mcg/actuation nasal spray, USE 1 SPRAY (50 MCG TOTAL) IN EACH NOSTRIL ONCE DAILY, Disp: 16 mL, Rfl: 1    lancets (LANCETS,THIN) Misc, Use to check blood sugar twice daily, Disp: 100 each, Rfl: 6     lancets Misc, Apply topically 2 (two) times daily., Disp: , Rfl:     pravastatin (PRAVACHOL) 40 MG tablet, Take 1 tablet (40 mg total) by mouth once daily., Disp: 90 tablet, Rfl: 1    citalopram (CELEXA) 40 MG tablet, Take 1 tablet (40 mg total) by mouth once daily., Disp: 90 tablet, Rfl: 3    losartan (COZAAR) 50 MG tablet, Take 1 tablet (50 mg total) by mouth once daily., Disp: 90 tablet, Rfl: 3    metFORMIN (GLUCOPHAGE) 500 MG tablet, Take 2 tablets (1,000 mg total) by mouth 2 (two) times daily with meals., Disp: 360 tablet, Rfl: 4    metoprolol succinate (TOPROL-XL) 100 MG 24 hr tablet, Take 1 tablet (100 mg total) by mouth once daily., Disp: 90 tablet, Rfl: 3    omeprazole (PRILOSEC) 20 MG capsule, Take 1 capsule (20 mg total) by mouth 2 (two) times a day., Disp: 180 capsule, Rfl: 3    tirzepatide (MOUNJARO) 12.5 mg/0.5 mL PnIj, Inject 12.5 mg into the skin every 7 days., Disp: 6 mL, Rfl: 3

## 2025-07-24 ENCOUNTER — PATIENT MESSAGE (OUTPATIENT)
Dept: PULMONOLOGY | Facility: CLINIC | Age: 55
End: 2025-07-24
Payer: COMMERCIAL